# Patient Record
Sex: FEMALE | Race: WHITE
[De-identification: names, ages, dates, MRNs, and addresses within clinical notes are randomized per-mention and may not be internally consistent; named-entity substitution may affect disease eponyms.]

---

## 2017-06-10 ENCOUNTER — HOSPITAL ENCOUNTER (EMERGENCY)
Dept: HOSPITAL 10 - E/R | Age: 57
Discharge: HOME | End: 2017-06-10
Payer: MEDICAID

## 2017-06-10 VITALS
HEIGHT: 62 IN | HEIGHT: 62 IN | BODY MASS INDEX: 32.05 KG/M2 | WEIGHT: 174.17 LBS | BODY MASS INDEX: 32.05 KG/M2 | WEIGHT: 174.17 LBS

## 2017-06-10 VITALS — TEMPERATURE: 98.3 F | TEMPERATURE: 98.3 F

## 2017-06-10 VITALS — SYSTOLIC BLOOD PRESSURE: 106 MMHG | RESPIRATION RATE: 16 BRPM | DIASTOLIC BLOOD PRESSURE: 65 MMHG | HEART RATE: 79 BPM

## 2017-06-10 DIAGNOSIS — K29.00: ICD-10-CM

## 2017-06-10 DIAGNOSIS — K76.0: Primary | ICD-10-CM

## 2017-06-10 DIAGNOSIS — K21.9: ICD-10-CM

## 2017-06-10 LAB
ADD SCAN DIFF: NO
ADD UMIC: YES
ALBUMIN SERPL-MCNC: 4.6 G/DL (ref 3.3–4.9)
ALBUMIN/GLOB SERPL: 1.53 {RATIO}
ALP SERPL-CCNC: 109 IU/L (ref 42–121)
ALT SERPL-CCNC: 111 IU/L (ref 13–69)
ANION GAP SERPL CALC-SCNC: 15 MMOL/L (ref 8–16)
AST SERPL-CCNC: 63 IU/L (ref 15–46)
BACTERIA #/AREA URNS HPF: (no result) /[HPF]
BASOPHILS # BLD AUTO: 0.1 10^3/UL (ref 0–0.1)
BASOPHILS NFR BLD: 0.7 % (ref 0–2)
BILIRUB DIRECT SERPL-MCNC: 0 MG/DL (ref 0–0.2)
BILIRUB SERPL-MCNC: 0.3 MG/DL (ref 0.2–1.3)
BUN SERPL-MCNC: 14 MG/DL (ref 7–20)
CALCIUM SERPL-MCNC: 9.6 MG/DL (ref 8.4–10.2)
CHLORIDE SERPL-SCNC: 106 MMOL/L (ref 97–110)
CO2 SERPL-SCNC: 26 MMOL/L (ref 21–31)
COLOR UR: (no result)
CREAT SERPL-MCNC: 0.6 MG/DL (ref 0.44–1)
EOSINOPHIL # BLD: 0.5 10^3/UL (ref 0–0.5)
EOSINOPHIL NFR BLD: 3.8 % (ref 0–7)
ERYTHROCYTE [DISTWIDTH] IN BLOOD BY AUTOMATED COUNT: 13.2 % (ref 11.5–14.5)
GLOBULIN SER-MCNC: 3 G/DL (ref 1.3–3.2)
GLUCOSE SERPL-MCNC: 128 MG/DL (ref 70–220)
GLUCOSE UR STRIP-MCNC: NEGATIVE %
HCT VFR BLD CALC: 39.8 % (ref 37–47)
HGB BLD-MCNC: 13.4 G/DL (ref 12–16)
INR PPP: 0.95
KETONES UR STRIP.AUTO-MCNC: NEGATIVE MG/DL
LYMPHOCYTES # BLD AUTO: 3.4 10^3/UL (ref 0.8–2.9)
LYMPHOCYTES NFR BLD AUTO: 28.2 % (ref 15–51)
MCH RBC QN AUTO: 31 PG (ref 29–33)
MCHC RBC AUTO-ENTMCNC: 33.7 G/DL (ref 32–37)
MCV RBC AUTO: 92.1 FL (ref 82–101)
MONOCYTES # BLD: 0.7 10^3/UL (ref 0.3–0.9)
MONOCYTES NFR BLD: 5.7 % (ref 0–11)
NEUTROPHILS # BLD: 7.4 10^3/UL (ref 1.6–7.5)
NEUTROPHILS NFR BLD AUTO: 61.3 % (ref 39–77)
NITRITE UR QL STRIP.AUTO: NEGATIVE
NRBC # BLD MANUAL: 0 10^3/UL (ref 0–0)
NRBC BLD QL: 0 /100WBC (ref 0–0)
PLATELET # BLD: 315 10^3/UL (ref 140–415)
PMV BLD AUTO: 11.4 FL (ref 7.4–10.4)
POTASSIUM SERPL-SCNC: 4 MMOL/L (ref 3.5–5.1)
PROT SERPL-MCNC: 7.6 G/DL (ref 6.1–8.1)
PROTHROMBIN TIME: 12.7 SEC (ref 12.2–14.2)
PT RATIO: 1
RBC # BLD AUTO: 4.32 10^6/UL (ref 4.2–5.4)
RBC # UR AUTO: (no result) /UL
RBC #/AREA URNS HPF: (no result) /HPF
SODIUM SERPL-SCNC: 143 MMOL/L (ref 135–144)
SQUAMOUS #/AREA URNS HPF: (no result) /[HPF]
TROPONIN I SERPL-MCNC: < 0.012 NG/ML (ref 0–0.12)
URINE BILIRUBIN (DIP): NEGATIVE
URINE TOTAL PROTEIN (DIP): NEGATIVE
UROBILINOGEN UR STRIP-ACNC: (no result) (ref 0.1–1)
WBC # BLD AUTO: 12.1 10^3/UL (ref 4.8–10.8)
WBC # UR STRIP: (no result) /UL

## 2017-06-10 PROCEDURE — 85610 PROTHROMBIN TIME: CPT

## 2017-06-10 PROCEDURE — 84703 CHORIONIC GONADOTROPIN ASSAY: CPT

## 2017-06-10 PROCEDURE — 76705 ECHO EXAM OF ABDOMEN: CPT

## 2017-06-10 PROCEDURE — 83690 ASSAY OF LIPASE: CPT

## 2017-06-10 PROCEDURE — 80053 COMPREHEN METABOLIC PANEL: CPT

## 2017-06-10 PROCEDURE — 93005 ELECTROCARDIOGRAM TRACING: CPT

## 2017-06-10 PROCEDURE — 81001 URINALYSIS AUTO W/SCOPE: CPT

## 2017-06-10 PROCEDURE — 85025 COMPLETE CBC W/AUTO DIFF WBC: CPT

## 2017-06-10 PROCEDURE — 36415 COLL VENOUS BLD VENIPUNCTURE: CPT

## 2017-06-10 PROCEDURE — 84484 ASSAY OF TROPONIN QUANT: CPT

## 2017-06-10 PROCEDURE — 71010: CPT

## 2017-06-10 NOTE — RADRPT
PROCEDURE:   XR Chest.

 

CLINICAL INDICATION:   Abdominal Pain.  Chest pain.

 

TECHNIQUE:   Single frontal chest x-ray. 

 

COMPARISON:   03/05/2016

 

FINDINGS:

The lungs are clear of acute infiltrates, edema, effusions, or masses..  The cardiomediastinal silho
uette is unremarkable. The osseous structures are intact.   

 

IMPRESSION:

No acute cardiopulmonary disease.   

 

RPTAT: QQ

_____________________________________________ 

.Ludin Jose MD, MD           Date    Time 

Electronically viewed and signed by .Ludin Jose MD, MD on 06/10/2017 18:12 

 

D:  06/10/2017 18:12  T:  06/10/2017 18:12

.L/

## 2017-06-10 NOTE — RADRPT
PROCEDURE:   US Abdomen (right upper quadrant). 

 

CLINICAL INDICATION:   Right upper quadrant pain 

 

TECHNIQUE:   Multiple real-time longitudinal and transverse images of the right upper quadrant of th
e abdomen were acquired utilizing a curved array transducer. Images were reviewed on a high-resoluti
on PACS workstation. 

 

COMPARISON:   None 

 

FINDINGS:

 

The liver is normal in size and fatty in echogenicity without focal mass or intrahepatic biliary dil
atation.  The gallbladder is normal.  The common bile duct measures 3.5 mm in maximal dimension.  Th
e visualized portions of the pancreas are unremarkable with obscuration of the tail of the pancreas.
  No free fluid is identified.

 

The right kidney measures 11.3 cm in length. There is no evidence of obstructive uropathy or urolith
iasis.  No renal masses seen. Visualized portions aorta and inferior vena cava are normal. 

 

IMPRESSION:

Hepatic steatosis.

Otherwise unremarkable right upper quadrant ultrasound. 

 

RPTAT: QQ

_____________________________________________ 

.Ludin Jose MD, MD           Date    Time 

Electronically viewed and signed by .Ludin Jose MD, MD on 06/10/2017 18:19 

 

D:  06/10/2017 18:19  T:  06/10/2017 18:19

.L/

## 2017-06-10 NOTE — ERD
ER Documentation


Chief Complaint


Date/Time


DATE: 6/10/17 


TIME: 17:42


Chief Complaint


dizziness , feels palpitations ,chest pain , nausea since last night





HPI


Patient is a 57-year-old female who presents with sudden onset, constant, dull, 

waxing and waning epigastric pain since 9 PM last night.  She states that the 

pain has never completely resolved since it started.  She states that the pain 

started while she was eating.  She vomited once.  She reports having one loose 

stool last night and a normal stool today.  She denies dark stools.  She denies 

fevers, shortness of breath.  She reports an acid taste in her mouth.  She 

reports being diagnosed with gallstones 4 months ago.





ROS


All systems reviewed and are negative except as per history of present illness.





Medications


Home Meds


Active Scripts


Famotidine* (Famotidine*) 20 Mg Tablet, 20 MG PO DAILY, #30 TAB


   Prov:REBEKAH MEDINA MD         6/10/17


Reported Medications


Metformin Hcl* (Metformin Hcl*) 500 Mg Tablet, 500 MG PO BID, #30 TAB


   6/10/17


Benazepril Hcl* (Benazepril Hcl*) 5 Mg Tablet, 5 MG PO DAILY, #30 TAB


   6/10/17


Glipizide* (Glipizide*) 5 Mg Tablet, 5 MG PO BID, TAB


   6/10/17


Aspirin* (Aspirin* Chew) 81 Mg Tab.chew, 81 MG PO DAILY, TAB.CHEW


   6/10/17


Discontinued Reported Medications


Aspirin (Jeovany Child) 81 Mg Chew, 81 MG PO


   1/20/12


Discontinued Scripts


Dextromethorphan Hb-Promethazine Hcl (Promethazine DM Syrup) 473 Ml Syrup, 5 ML 

PO Q6H Y for COUGH, #4 OZ


   Prov:CLARIBEL BURRELL MD         9/4/16


Ibuprofen* (Motrin*) 600 Mg Tab, 600 MG PO Q6, #15 TAB


   Prov:CLARIBEL BURRELL MD         9/4/16


Azithromycin* (Zithromax*) 250 Mg Tablet, 250 MG PO .ZPACK AS DIRECTED, #6 TAB


   TAKE 500 MG (2 TABS) THE FIRST DAY THEN 250 MG (1 TAB) DAYS 2-5


   Prov:CLARIBEL BURRELL MD         9/4/16


Fluconazole* (Diflucan*) 150 Mg Tablet, 150 MG PO ONCE, #1 TAB


   Prov:ERNESTO WORTHINGTON NP         7/26/16


Clotrimazole* (Clotrimazole-7*) Vaginal Cream..g., 1 APPLIC VAG HS for 7 Days, 

EA


   Prov:ERNESTO WORTHINGTON NP         7/26/16


Hydrocodone Bit-Acetaminophen* (Norco*) 5-325 Mg Tab, 1 TAB PO Q6 Y for PAIN, #

20 TAB


   Prov:ERNESTO WORTHINGTON NP         7/26/16


Phenazopyridine Hcl* (Pyridium*) 200 Mg Tab, 200 MG PO TID Y for URINARY PAIN, #

6 TAB


   Prov:ERNESTO WORTHINGTON NP         7/26/16


Ciprofloxacin Hcl* (Ciprofloxacin Hcl*) 500 Mg Tablet, 500 MG PO BID for 7 Days

, TAB


   Prov:ERNESTO WORTHINGTON NP         7/26/16


Acetaminophen-Codeine* (Acetaminophen-Cod #3*) 300-30 Mg Tab, 1 TAB PO Q4H Y 

for PAIN, #12 TAB


   Prov:CLARIBEL BURRELL MD         6/4/16


Cephalexin* (Keflex*) 500 Mg Capsule, 500 MG PO QID for 7 Days, CAP


   Prov:CLARIBEL BURRELL MD         6/4/16


Ondansetron Hcl* (Zofran* ODT) 4 mg -ODT Tab.disper, 4 MG PO Q6 Y for NAUSEA AND

/OR VOMITING, #10 TAB


   Prov:ABUNDIO PRITCHARD DO         3/18/16


Hydrocodone Bit-Acetaminophen* (Norco*) 7.5-325 Tablet, 1 TAB PO Q4H Y for PAIN

, #14 TAB


   Prov:ABUNDIO PRITCHARD DO         3/18/16


Ondansetron Hcl* (Zofran*) 4 Mg Tablet, 4 MG PO TID for NAUSEA AND/OR VOMITING, 

#12 TAB


   Prov:LUIS DANIEL HARRIS MD         3/5/16


Cephalexin* (Cephalexin*) 500 Mg Capsule, 500 MG PO TID, #21 CAP


   Prov:LUIS DANIEL HARRIS MD         3/5/16


Glipizide* (Glipizide*) 5 Mg Tablet, 5 MG PO BID, #30 TAB


   Prov:MAUREEN HUMPHREY PA-C         1/23/16


Metformin* (Glucophage*) 1,000 Mg Tablet, 1000 MG PO BID, #30 TAB


   Prov:MAUREEN HUMPHREY PA-C         1/23/16


Hydrocodone Bit-Acetaminophen* (Norco*) 5-325 Mg Tab, 1 TAB PO Q6 Y for PAIN, #

7 TAB


   Prov:TUNDE CRAWFORD DO         8/14/15


Naproxen* (Naprosyn*) 500 Mg Tablet, 500 MG PO BID Y for PAIN AND/OR 

INFLAMMATION, #20 TAB


   Prov:TUNDE CRAWFORD DO         8/14/15


Ibuprofen* (Ibuprofen*) 400 Mg Tablet, 400 MG PO Q6H Y for PAIN, #30 TAB


   Prov:LASHA FERREIRA MD         7/14/15


Metformin* (Glucophage*) 1,000 Mg Tablet, 1000 MG PO BID, #60 TAB 4 Refills


   Prov:LASHA FERREIRA MD         7/14/15


Glipizide* (Glipizide*) 5 Mg Tablet, 5 MG PO BID, #60 TAB 2 Refills


   Prov:LASHA FERREIRA MD         7/14/15


Benazepril Hcl* (Benazepril Hcl*) 5 Mg Tab, 5 MG PO DAILY, #30 TAB 2 Refills


   Prov:LASHA FERREIRA MD         7/14/15





Allergies


Allergies:  


Coded Allergies:  


     No Known Drug Allergies (Verified  Allergy, Unknown, 7/25/16)





PMhx/Soc


Past medical history: Diabetes, hypertension


Past surgical history: Hysterectomy


Social history: Denies tobacco or alcohol


History of Surgery:  Yes (hysterectomy)


Anesthesia Reaction:  No


Hx Neurological Disorder:  No


Hx Respiratory Disorders:  No


Hx Cardiac Disorders:  Yes (HTN)


Hx Psychiatric Problems:  No


Hx Miscellaneous Medical Probl:  Yes (dm, htn)


Hx Alcohol Use:  No


Hx Substance Use:  No


Hx Tobacco Use:  No


Smoking Status:  Never smoker





FmHx


Family History:  No coronary disease, No diabetes





Physical Exam


Vitals





Vital Signs








  Date Time  Temp Pulse Resp B/P Pulse Ox O2 Delivery O2 Flow Rate FiO2


 


6/10/17 20:14  79 16 106/65 100 Room Air  


 


6/10/17 18:38 98.3 79 18 117/53 99 Room Air  


 


6/10/17 15:58 97.9 90 18 135/63 98   








Physical Exam


Const:     Alert, no acute distress


Head:   Atraumatic 


Eyes:    Normal Conjunctiva, no pallor, no icterus


ENT:    Normal External Ears, Nose and Mouth.  Moist mucous membranes


Neck:               Full range of motion..~ No meningismus.  No JVD


Resp:    Clear to auscultation bilaterally, no wheezes, no rales


Cardio:    Regular rate and rhythm, no murmurs


Abd:    Soft, non distended.  Mild tenderness in the epigastrium and right 

upper quadrant, no guarding, no rebound


Skin:    No petechiae or rashes


Back:    No midline or flank tenderness, no CVA tenderness


Ext:    No cyanosis, or edema


Neur:    Awake and alert


Psych:    Normal Mood and Affect


Result Diagram:  


6/10/17 1821                                                                   

             6/10/17 1821





Results 24 hrs





 Laboratory Tests








Test


  6/10/17


18:21 6/10/17


19:40


 


White Blood Count 12.110^3/ul  


 


Red Blood Count 4.3210^6/ul  


 


Hemoglobin 13.4g/dl  


 


Hematocrit 39.8%  


 


Mean Corpuscular Volume 92.1fl  


 


Mean Corpuscular Hemoglobin 31.0pg  


 


Mean Corpuscular Hemoglobin


Concent 33.7g/dl 


  


 


 


Red Cell Distribution Width 13.2%  


 


Platelet Count 25682^3/UL  


 


Mean Platelet Volume 11.4fl  


 


Neutrophils % 61.3%  


 


Lymphocytes % 28.2%  


 


Monocytes % 5.7%  


 


Eosinophils % 3.8%  


 


Basophils % 0.7%  


 


Nucleated Red Blood Cells % 0.0/100WBC  


 


Neutrophils # 7.410^3/ul  


 


Lymphocytes # 3.410^3/ul  


 


Monocytes # 0.710^3/ul  


 


Eosinophils # 0.510^3/ul  


 


Basophils # 0.110^3/ul  


 


Nucleated Red Blood Cells # 0.010^3/ul  


 


Prothrombin Time 12.7Sec  


 


Prothrombin Time Ratio 1.0  


 


INR International Normalized


Ratio 0.95 


  


 


 


Sodium Level 143mmol/L  


 


Potassium Level 4.0mmol/L  


 


Chloride Level 106mmol/L  


 


Carbon Dioxide Level 26mmol/L  


 


Anion Gap 15  


 


Blood Urea Nitrogen 14mg/dl  


 


Creatinine 0.60mg/dl  


 


Glucose Level 128mg/dl  


 


Calcium Level 9.6mg/dl  


 


Total Bilirubin 0.3mg/dl  


 


Direct Bilirubin 0.00mg/dl  


 


Indirect Bilirubin 0.3mg/dl  


 


Aspartate Amino Transf


(AST/SGOT) 63IU/L 


  


 


 


Alanine Aminotransferase


(ALT/SGPT) 111IU/L 


  


 


 


Alkaline Phosphatase 109IU/L  


 


Troponin I < 0.012ng/ml  


 


Total Protein 7.6g/dl  


 


Albumin 4.6g/dl  


 


Globulin 3.00g/dl  


 


Albumin/Globulin Ratio 1.53  


 


Lipase 98U/L  


 


Serum HCG, Qualitative NEGATIVE  


 


Urine Color  LT. YELLOW 


 


Urine Clarity  CLEAR 


 


Urine pH  6.5 


 


Urine Specific Gravity  1.015 


 


Urine Ketones  NEGATIVE 


 


Urine Nitrite  NEGATIVE 


 


Urine Bilirubin  NEGATIVE 


 


Urine Urobilinogen  0.2  E.U./dL 


 


Urine Leukocyte Esterase  TRACE 


 


Urine Microscopic RBC  2-5/HPF 


 


Urine Microscopic WBC  2-5/HPF 


 


Urine Squamous Epithelial


Cells 


  FEW 


 


 


Urine Bacteria  RARE 


 


Urine Hemoglobin  1+ 


 


Urine Glucose  NEGATIVE% 


 


Urine Total Protein  NEGATIVE 








 Current Medications








 Medications


  (Trade)  Dose


 Ordered  Sig/Gala


 Route


 PRN Reason  Start Time


 Stop Time Status Last Admin


Dose Admin


 


 Famotidine


  (Pepcid)  20 mg  ONCE  STAT


 PO


   6/10/17 17:41


 6/10/17 17:42 DC 6/10/17 17:41


 











Procedures/MDM


EKG read by me: Time 1603, rate 84


Rhythm: Normal sinus


Axis: Left axis deviation


Intervals: Normal


ST-T waves: no ischemic changes


Ectopy: No


Q-waves: No


Impression:     No evidence of ischemia or arrhythmia





MDM: Patient is a 57-year-old female who presents with epigastric pain since 

last night.  She reports symptoms that are highly suggestive of GERD.  Her pain 

is been constant since onset.  She has no evidence of ischemia on EKG and 

negative troponin.  Her LFTs are unremarkable except for mild elevation of ALT 

greater than AST.  Review of the patient's chart shows hepatic steatosis on a 

CT scan during a visit 2 years ago.  The patient reports a history of gallstones

, but on ultrasound has no evidence of gallstones or other biliary disease.  

The patient does have evidence of ongoing hepatic steatosis.  The patient was 

given Pepcid in the ER, and states that her symptoms have completely resolved.  

Her symptoms are consistent with a GI cause.  I have advised her to return to 

the ER for worsening symptoms and otherwise to follow-up with her PMD.  I will 

prescribe her Pepcid.  I have advised her to eat a low-fat diet.





Departure


Diagnosis:  


 Primary Impression:  


 Hepatic steatosis


 Additional Impressions:  


 Gastritis


 Gastritis type:  unspecified gastritis  Chronicity:  acute  Gastritis bleeding

:  without bleeding  Qualified Code:  K29.00 - Acute gastritis without 

hemorrhage, unspecified gastritis type


 GERD (gastroesophageal reflux disease)


 Esophagitis presence:  esophagitis presence not specified  Qualified Code:  

K21.9 - Gastroesophageal reflux disease, esophagitis presence not specified


Condition:  REBEKAH Gallego MD Jared 10, 2017 17:45

## 2017-06-14 ENCOUNTER — HOSPITAL ENCOUNTER (EMERGENCY)
Dept: HOSPITAL 10 - E/R | Age: 57
Discharge: HOME | End: 2017-06-14
Payer: MEDICAID

## 2017-06-14 VITALS
RESPIRATION RATE: 18 BRPM | DIASTOLIC BLOOD PRESSURE: 81 MMHG | SYSTOLIC BLOOD PRESSURE: 118 MMHG | RESPIRATION RATE: 18 BRPM | DIASTOLIC BLOOD PRESSURE: 81 MMHG | HEART RATE: 81 BPM | HEART RATE: 81 BPM | SYSTOLIC BLOOD PRESSURE: 118 MMHG

## 2017-06-14 VITALS
WEIGHT: 175.27 LBS | BODY MASS INDEX: 31.05 KG/M2 | WEIGHT: 175.27 LBS | BODY MASS INDEX: 31.05 KG/M2 | HEIGHT: 63 IN | HEIGHT: 63 IN

## 2017-06-14 DIAGNOSIS — I10: ICD-10-CM

## 2017-06-14 DIAGNOSIS — E11.9: Primary | ICD-10-CM

## 2017-06-14 DIAGNOSIS — R05: ICD-10-CM

## 2017-06-14 DIAGNOSIS — Z79.84: ICD-10-CM

## 2017-06-14 DIAGNOSIS — R51: ICD-10-CM

## 2017-06-14 DIAGNOSIS — Z79.82: ICD-10-CM

## 2017-06-14 DIAGNOSIS — G47.00: ICD-10-CM

## 2017-06-14 DIAGNOSIS — J02.9: ICD-10-CM

## 2017-06-14 LAB
ADD SCAN DIFF: NO
ADD UMIC: YES
ALBUMIN SERPL-MCNC: 4.9 G/DL (ref 3.3–4.9)
ALBUMIN/GLOB SERPL: 1.68 {RATIO}
ALP SERPL-CCNC: 112 IU/L (ref 42–121)
ALT SERPL-CCNC: 111 IU/L (ref 13–69)
ANION GAP SERPL CALC-SCNC: 14 MMOL/L (ref 8–16)
AST SERPL-CCNC: 69 IU/L (ref 15–46)
BACTERIA #/AREA URNS HPF: (no result) /[HPF]
BASOPHILS # BLD AUTO: 0.1 10^3/UL (ref 0–0.1)
BASOPHILS NFR BLD: 0.5 % (ref 0–2)
BILIRUB DIRECT SERPL-MCNC: 0 MG/DL (ref 0–0.2)
BILIRUB SERPL-MCNC: 0.5 MG/DL (ref 0.2–1.3)
BUN SERPL-MCNC: 14 MG/DL (ref 7–20)
CALCIUM SERPL-MCNC: 9.5 MG/DL (ref 8.4–10.2)
CHLORIDE SERPL-SCNC: 101 MMOL/L (ref 97–110)
CO2 SERPL-SCNC: 27 MMOL/L (ref 21–31)
COLOR UR: (no result)
CREAT SERPL-MCNC: 0.65 MG/DL (ref 0.44–1)
EOSINOPHIL # BLD: 0.6 10^3/UL (ref 0–0.5)
EOSINOPHIL NFR BLD: 5.8 % (ref 0–7)
ERYTHROCYTE [DISTWIDTH] IN BLOOD BY AUTOMATED COUNT: 13.2 % (ref 11.5–14.5)
GLOBULIN SER-MCNC: 2.9 G/DL (ref 1.3–3.2)
GLUCOSE SERPL-MCNC: 136 MG/DL (ref 70–220)
GLUCOSE UR STRIP-MCNC: NEGATIVE %
HCT VFR BLD CALC: 41.3 % (ref 37–47)
HGB BLD-MCNC: 13.5 G/DL (ref 12–16)
KETONES UR STRIP.AUTO-MCNC: NEGATIVE MG/DL
LYMPHOCYTES # BLD AUTO: 2.6 10^3/UL (ref 0.8–2.9)
LYMPHOCYTES NFR BLD AUTO: 23.6 % (ref 15–51)
MCH RBC QN AUTO: 29.8 PG (ref 29–33)
MCHC RBC AUTO-ENTMCNC: 32.7 G/DL (ref 32–37)
MCV RBC AUTO: 91.2 FL (ref 82–101)
MONOCYTES # BLD: 1 10^3/UL (ref 0.3–0.9)
MONOCYTES NFR BLD: 9.1 % (ref 0–11)
NEUTROPHILS # BLD: 6.7 10^3/UL (ref 1.6–7.5)
NEUTROPHILS NFR BLD AUTO: 60.5 % (ref 39–77)
NITRITE UR QL STRIP.AUTO: NEGATIVE
NRBC # BLD MANUAL: 0 10^3/UL (ref 0–0)
NRBC BLD QL: 0 /100WBC (ref 0–0)
PLATELET # BLD: 300 10^3/UL (ref 140–415)
PMV BLD AUTO: 11.1 FL (ref 7.4–10.4)
POTASSIUM SERPL-SCNC: 4.1 MMOL/L (ref 3.5–5.1)
PROT SERPL-MCNC: 7.8 G/DL (ref 6.1–8.1)
RBC # BLD AUTO: 4.53 10^6/UL (ref 4.2–5.4)
RBC # UR AUTO: (no result) /UL
RBC #/AREA URNS HPF: (no result) /HPF
SODIUM SERPL-SCNC: 138 MMOL/L (ref 135–144)
SQUAMOUS #/AREA URNS HPF: (no result) /[HPF]
TROPONIN I SERPL-MCNC: < 0.012 NG/ML (ref 0–0.12)
URINE BILIRUBIN (DIP): NEGATIVE
URINE TOTAL PROTEIN (DIP): NEGATIVE
UROBILINOGEN UR STRIP-ACNC: (no result) (ref 0.1–1)
WBC # BLD AUTO: 11 10^3/UL (ref 4.8–10.8)
WBC # UR STRIP: NEGATIVE /UL

## 2017-06-14 PROCEDURE — 71010: CPT

## 2017-06-14 PROCEDURE — 81001 URINALYSIS AUTO W/SCOPE: CPT

## 2017-06-14 PROCEDURE — 85025 COMPLETE CBC W/AUTO DIFF WBC: CPT

## 2017-06-14 PROCEDURE — 84484 ASSAY OF TROPONIN QUANT: CPT

## 2017-06-14 PROCEDURE — 83690 ASSAY OF LIPASE: CPT

## 2017-06-14 PROCEDURE — 96374 THER/PROPH/DIAG INJ IV PUSH: CPT

## 2017-06-14 PROCEDURE — 36415 COLL VENOUS BLD VENIPUNCTURE: CPT

## 2017-06-14 PROCEDURE — 93005 ELECTROCARDIOGRAM TRACING: CPT

## 2017-06-14 PROCEDURE — 80053 COMPREHEN METABOLIC PANEL: CPT

## 2017-06-14 NOTE — RADRPT
PROCEDURE:   CHEST - 1 VIEW  

 

CLINICAL INDICATION:   57-year-old female with chest/abdominal pain. 

 

TECHNIQUE:   A single frontal AP upright view of the chest was performed.  The images were reviewed 
on a PACS workstation. 

 

COMPARISON:   Chest x-ray Khushi 10, 2017.  

 

FINDINGS:

 

The cardiomediastinal silhouette has a normal appearance.  There is no evidence for an infiltrate.  
There is no evidence for congestive heart failure. There is no evidence for pneumothorax. The osseou
s structures are intact. 

 

IMPRESSION:

 

No evidence for active cardiopulmonary disease.

_____________________________________________ 

.Rodolfo Downey MD, MD           Date    Time 

Electronically viewed and signed by .Rodolfo Downey MD, MD on 06/14/2017 22:12 

 

D:  06/14/2017 22:12  T:  06/14/2017 22:12

.M/

## 2017-06-14 NOTE — ERD
ER Documentation


Chief Complaint


Date/Time


DATE: 17 


TIME: 20:13


Chief Complaint


abd pain, cough x 3 days, headache , insomnia





HPI


57-year-old female history of diabetes, hypertension and dyslipidemia presents 

the ED with multiple symptoms including generalized body pain, abdominal pain, 

sore throat, cough, headache and insomnia.  Patient with multiple previous 

visits for similar symptoms most recently 6/10/2017.  Ongoing, mild, burning, 

epigastric abdominal pain without nausea, vomiting, diarrhea, constipation, 

hematemesis, hematochezia or melanotic stools.  Generalized, gradual onset, 

mild headache which he has chronically.  Nonproductive cough but no shortness 

of breath, orthopnea or exertional dyspnea.  Denies leg pain or swelling.  Mild 

odynophagia but no dysphagia.  No rhinorrhea or nasal congestion.  No fevers or 

chills.





ROS


All systems reviewed and are negative except as per history of present illness.





Medications


Home Meds


Reported Medications


Simvastatin* (Zocor*) 5 Mg Tablet, 5 MG PO QHS, #30 TAB


   17


Metformin Hcl* (Metformin Hcl*) 500 Mg Tablet, 500 MG PO BID, #30 TAB


   6/10/17


Benazepril Hcl* (Benazepril Hcl*) 5 Mg Tablet, 5 MG PO DAILY, #30 TAB


   6/10/17


Glipizide* (Glipizide*) 5 Mg Tablet, 5 MG PO BID, TAB


   6/10/17


Aspirin* (Aspirin* Chew) 81 Mg Tab.chew, 81 MG PO DAILY, TAB.CHEW


   6/10/17


Discontinued Reported Medications


Aspirin (Jeovany Child) 81 Mg Chew, 81 MG PO


   12


Discontinued Scripts


Famotidine* (Famotidine*) 20 Mg Tablet, 20 MG PO DAILY, #30 TAB


   Prov:REBEKAH MEDINA MD         6/10/17


Dextromethorphan Hb-Promethazine Hcl (Promethazine DM Syrup) 473 Ml Syrup, 5 ML 

PO Q6H Y for COUGH, #4 OZ


   Prov:CLARIBEL BURRELL MD         16


Ibuprofen* (Motrin*) 600 Mg Tab, 600 MG PO Q6, #15 TAB


   Prov:CLARIBEL BURRELL MD         16


Azithromycin* (Zithromax*) 250 Mg Tablet, 250 MG PO .ZPACK AS DIRECTED, #6 TAB


   TAKE 500 MG (2 TABS) THE FIRST DAY THEN 250 MG (1 TAB) DAYS 2-5


   Prov:CLARIBEL BURRELL MD         16


Fluconazole* (Diflucan*) 150 Mg Tablet, 150 MG PO ONCE, #1 TAB


   Prov:ERNESTO WORTHINGTON NP         16


Clotrimazole* (Clotrimazole-7*) Vaginal Cream..g., 1 APPLIC VAG HS for 7 Days, 

EA


   Prov:ERNESTO WORTHINGTON NP         16


Hydrocodone Bit-Acetaminophen* (Norco*) 5-325 Mg Tab, 1 TAB PO Q6 Y for PAIN, #

20 TAB


   Prov:ERNESTO WORTHINGTON NP         16


Phenazopyridine Hcl* (Pyridium*) 200 Mg Tab, 200 MG PO TID Y for URINARY PAIN, #

6 TAB


   Prov:ERNESTO WORTHINGTON NP         16


Ciprofloxacin Hcl* (Ciprofloxacin Hcl*) 500 Mg Tablet, 500 MG PO BID for 7 Days

, TAB


   Prov:ERNESTO WORTHINGTON NP         16


Acetaminophen-Codeine* (Acetaminophen-Cod #3*) 300-30 Mg Tab, 1 TAB PO Q4H Y 

for PAIN, #12 TAB


   Prov:CLARIBEL BURRELL MD         16


Cephalexin* (Keflex*) 500 Mg Capsule, 500 MG PO QID for 7 Days, CAP


   Prov:CLARIBEL BURRELL MD         16


Ondansetron Hcl* (Zofran* ODT) 4 mg -ODT Tab.disper, 4 MG PO Q6 Y for NAUSEA AND

/OR VOMITING, #10 TAB


   Prov:JEFF PRITCHARDSTSYLVIES AAmi DO         3/18/16


Hydrocodone Bit-Acetaminophen* (Norco*) 7.5-325 Tablet, 1 TAB PO Q4H Y for PAIN

, #14 TAB


   Prov:JEFF PRITCHARDSTOLOS A. DO         3/18/16


Ondansetron Hcl* (Zofran*) 4 Mg Tablet, 4 MG PO TID for NAUSEA AND/OR VOMITING, 

#12 TAB


   Prov:LUIS DANIEL HARRIS MD         3/5/16


Cephalexin* (Cephalexin*) 500 Mg Capsule, 500 MG PO TID, #21 CAP


   Prov:LUIS DANIEL HARRIS MD         3/5/16


Glipizide* (Glipizide*) 5 Mg Tablet, 5 MG PO BID, #30 TAB


   Prov:MAUREEN HUMPHREY PA-C         16


Metformin* (Glucophage*) 1,000 Mg Tablet, 1000 MG PO BID, #30 TAB


   Prov:MAUREEN HUMPHREY PA-C         16


Hydrocodone Bit-Acetaminophen* (Norco*) 5-325 Mg Tab, 1 TAB PO Q6 Y for PAIN, #

7 TAB


   Prov:TUNDE CRAWFORD DO         8/14/15


Naproxen* (Naprosyn*) 500 Mg Tablet, 500 MG PO BID Y for PAIN AND/OR 

INFLAMMATION, #20 TAB


   Prov:TUNDE CRAWFORD DO         8/14/15


Ibuprofen* (Ibuprofen*) 400 Mg Tablet, 400 MG PO Q6H Y for PAIN, #30 TAB


   Prov:LASHA FERREIRA MD         7/14/15


Metformin* (Glucophage*) 1,000 Mg Tablet, 1000 MG PO BID, #60 TAB 4 Refills


   Prov:LASHA FERREIRA MD         7/14/15


Glipizide* (Glipizide*) 5 Mg Tablet, 5 MG PO BID, #60 TAB 2 Refills


   Prov:LASHA FERREIRA MD         7/14/15


Benazepril Hcl* (Benazepril Hcl*) 5 Mg Tab, 5 MG PO DAILY, #30 TAB 2 Refills


   Prov:LASHA FERREIRA MD         7/14/15





Allergies


Allergies:  


Coded Allergies:  


     No Known Drug Allergies (Verified  Allergy, Unknown, 17)





PMhx/Soc


Reviewed in chart.


History of Surgery:  Yes (hysterectomy)


Anesthesia Reaction:  No


Hx Neurological Disorder:  No


Hx Respiratory Disorders:  No


Hx Cardiac Disorders:  Yes (HTN)


Hx Psychiatric Problems:  No


Hx Miscellaneous Medical Probl:  Yes (dm, htn)


Hx Alcohol Use:  No


Hx Substance Use:  No


Hx Tobacco Use:  No





FmHx


Reviewed in chart.  As per HPI.  No stroke or cancer





Physical Exam


Vitals





Vital Signs








  Date Time  Temp Pulse Resp B/P Pulse Ox O2 Delivery O2 Flow Rate FiO2


 


17 18:26 99.3 105 20 125/71 97   








Physical Exam


Const:    Alert, anxious


Head:   Atraumatic 


Eyes:    Normal Conjunctiva


ENT:    Normal External Ears, Nose and Mouth.  Pharynx is clear without 

erythema or exudate.


Neck:               Full range of motion.  Nontender.  No JVD.  No 

lymphadenopathy or tenderness.


Resp:    Clear to auscultation bilaterally


Cardio:    Regular rate and rhythm, no murmurs


Abd:    Soft, obese.  Mild epigastric tenderness but no rebound or guarding.  

No masses or abnormal pulsations.  Bowel sounds are normal.


Skin:    No petechiae or rashes


Back:    No midline or flank tenderness


Ext:    No cyanosis, or edema


Neur:    Awake and alert


Psych:    Patient appears anxious but not depressed.


Result Diagram:  


17





Results 24 hrs








 Laboratory Tests








Test


  17


20:22 17


20:25


 


Urine Color LT. YELLOW  


 


Urine Clarity CLEAR  


 


Urine pH 7.5  


 


Urine Specific Gravity 1.010  


 


Urine Ketones NEGATIVE  


 


Urine Nitrite NEGATIVE  


 


Urine Bilirubin NEGATIVE  


 


Urine Urobilinogen 0.2  E.U./dL  


 


Urine Leukocyte Esterase NEGATIVE  


 


Urine Microscopic RBC 0-2/HPF  


 


Urine Microscopic WBC 0-2/HPF  


 


Urine Squamous Epithelial


Cells FEW 


  


 


 


Urine Bacteria RARE  


 


Urine Hemoglobin 1+  


 


Urine Glucose NEGATIVE%  


 


Urine Total Protein NEGATIVE  


 


White Blood Count  11.010^3/ul 


 


Red Blood Count  4.5310^6/ul 


 


Hemoglobin  13.5g/dl 


 


Hematocrit  41.3% 


 


Mean Corpuscular Volume  91.2fl 


 


Mean Corpuscular Hemoglobin  29.8pg 


 


Mean Corpuscular Hemoglobin


Concent 


  32.7g/dl 


 


 


Red Cell Distribution Width  13.2% 


 


Platelet Count  75162^3/UL 


 


Mean Platelet Volume  11.1fl 


 


Neutrophils %  60.5% 


 


Lymphocytes %  23.6% 


 


Monocytes %  9.1% 


 


Eosinophils %  5.8% 


 


Basophils %  0.5% 


 


Nucleated Red Blood Cells %  0.0/100WBC 


 


Neutrophils #  6.710^3/ul 


 


Lymphocytes #  2.610^3/ul 


 


Monocytes #  1.010^3/ul 


 


Eosinophils #  0.610^3/ul 


 


Basophils #  0.110^3/ul 


 


Nucleated Red Blood Cells #  0.010^3/ul 


 


Sodium Level  138mmol/L 


 


Potassium Level  4.1mmol/L 


 


Chloride Level  101mmol/L 


 


Carbon Dioxide Level  27mmol/L 


 


Anion Gap  14 


 


Blood Urea Nitrogen  14mg/dl 


 


Creatinine  0.65mg/dl 


 


Glucose Level  136mg/dl 


 


Calcium Level  9.5mg/dl 


 


Total Bilirubin  0.5mg/dl 


 


Direct Bilirubin  0.00mg/dl 


 


Indirect Bilirubin  0.5mg/dl 


 


Aspartate Amino Transf


(AST/SGOT) 


  69IU/L 


 


 


Alanine Aminotransferase


(ALT/SGPT) 


  111IU/L 


 


 


Alkaline Phosphatase  112IU/L 


 


Troponin I  < 0.012ng/ml 


 


Total Protein  7.8g/dl 


 


Albumin  4.9g/dl 


 


Globulin  2.90g/dl 


 


Albumin/Globulin Ratio  1.68 


 


Lipase  128U/L 








 Current Medications








 Medications


  (Trade)  Dose


 Ordered  Sig/Gala


 Route


 PRN Reason  Start Time


 Stop Time Status Last Admin


Dose Admin


 


 Ketorolac


 Tromethamine


  (Toradol)  15 mg  ONCE  STAT


 IV


   17 20:13


 17 20:14 DC 17 20:22


 














PROCEDURE:   CHEST - 1 VIEW  


 


CLINICAL INDICATION:   57-year-old female with chest/abdominal pain. 


 


TECHNIQUE:   A single frontal AP upright view of the chest was performed.  The 

images were reviewed on a PACS workstation. 


 


COMPARISON:   Chest x-ray Khushi 10, 2017.  


 


FINDINGS:


 


The cardiomediastinal silhouette has a normal appearance.  There is no evidence 

for an infiltrate.  There is no evidence for congestive heart failure. There is 

no evidence for pneumothorax. The osseous structures are intact. 


 


IMPRESSION:


 


No evidence for active cardiopulmonary disease.


_____________________________________________ 


.Rodolfo Downey MD, MD           Date    Time 


Electronically viewed and signed by .Rodolfo Downey MD, MD on 2017 22:12 


 


D:  2017 22:12  T:  2017 22:12


.M/














EKG:   Time:20:33.  Sinus rhythm.  Ventricular rate 93.  Left axis deviation.  

No acute ST segment elevation or depression.  Normal MT QRS.  No ectopy.  EP 

interpretation: Abnormal ECG





Procedures/MDM


DOCUMENTS REVIEWED:   ED nurse, prior ED, prior records








MEDICAL DECISION MAKIN-year-old female history of diabetes, hypertension 

and dyslipidemia presents the ED with multiple symptoms including generalized 

body pain, abdominal pain, sore throat, cough, headache and insomnia.  ED 

workup is unremarkable.  Abdominal exam is benign without rebound, guarding or 

other signs of peritonitis.  Recent imaging studies were unremarkable.  Lungs 

are clear without evidence of bronchospasm and chest x-ray reveals no evidence 

of infiltrate, effusion or pulmonary vascular congestion.  Nonspecific headache 

of uncertain etiology.  No focal deficit or other signs of CVA or TIA.  This is 

the patient's baseline headache which is gradual onset, not described of the 

worst headache of her life or concerning for subarachnoid hemorrhage, mass or 

hydrocephalus hence neuroimaging is deferred.  No signs of meningitis or 

encephalitis.  Patient complains of odynophagia but exam is unremarkable.  A 

supratentorial component to her symptoms including depression is considered.  

She denies any suicidality or homicidality.  Additional stressors include 

recent loss of her medical insurance coverage.  Stable for discharge 

precautionary instructions and outpatient follow-up as counseled.








Counseled patient and family regarding diagnostic workup, diagnosis and need 

for followup. Understands to return to ED if symptoms recur, worsen or any 

other concerns.





Departure


Diagnosis:  


 Primary Impression:  


 Multiple complaints


 Additional Impressions:  


 Type 2 diabetes mellitus


 Diabetes mellitus complication status:  without complication  Diabetes 

mellitus long term insulin use:  without long term use  Qualified Code:  E11.9 

- Type 2 diabetes mellitus without complication, without long-term current use 

of insulin


 Hypertension


 Hypertension type:  essential hypertension  Qualified Code:  I10 - Essential 

hypertension


 Abdominal pain of unknown cause


Condition:  Stable











CAMILA CERNA MD 2017 20:13

## 2017-08-23 ENCOUNTER — HOSPITAL ENCOUNTER (EMERGENCY)
Dept: HOSPITAL 10 - FTE | Age: 57
Discharge: HOME | End: 2017-08-23
Payer: MEDICAID

## 2017-08-23 VITALS — HEIGHT: 55 IN | WEIGHT: 293 LBS | BODY MASS INDEX: 67.81 KG/M2

## 2017-08-23 DIAGNOSIS — M54.42: Primary | ICD-10-CM

## 2017-08-23 DIAGNOSIS — Z79.84: ICD-10-CM

## 2017-08-23 DIAGNOSIS — E11.9: ICD-10-CM

## 2017-08-23 DIAGNOSIS — Z79.82: ICD-10-CM

## 2017-08-23 DIAGNOSIS — R31.29: ICD-10-CM

## 2017-08-23 DIAGNOSIS — I10: ICD-10-CM

## 2017-08-23 LAB
ADD UMIC: YES
ALBUMIN SERPL-MCNC: 4.5 G/DL (ref 3.3–4.9)
ALBUMIN/GLOB SERPL: 1.32 {RATIO}
ALP SERPL-CCNC: 136 IU/L (ref 42–121)
ALT SERPL-CCNC: 155 IU/L (ref 13–69)
ANION GAP SERPL CALC-SCNC: 15 MMOL/L (ref 8–16)
AST SERPL-CCNC: 87 IU/L (ref 15–46)
BACTERIA #/AREA URNS HPF: (no result) /HPF
BASOPHILS # BLD AUTO: 0.1 10^3/UL (ref 0–0.1)
BASOPHILS NFR BLD: 0.8 % (ref 0–2)
BILIRUB DIRECT SERPL-MCNC: 0 MG/DL (ref 0–0.2)
BILIRUB SERPL-MCNC: 0.4 MG/DL (ref 0.2–1.3)
BUN SERPL-MCNC: 15 MG/DL (ref 7–20)
CALCIUM SERPL-MCNC: 9.3 MG/DL (ref 8.4–10.2)
CHLORIDE SERPL-SCNC: 99 MMOL/L (ref 97–110)
CO2 SERPL-SCNC: 27 MMOL/L (ref 21–31)
COLOR UR: YELLOW
CREAT SERPL-MCNC: 0.97 MG/DL (ref 0.44–1)
EOSINOPHIL # BLD: 0.4 10^3/UL (ref 0–0.5)
EOSINOPHIL NFR BLD: 3.1 % (ref 0–7)
ERYTHROCYTE [DISTWIDTH] IN BLOOD BY AUTOMATED COUNT: 13 % (ref 11.5–14.5)
GLOBULIN SER-MCNC: 3.4 G/DL (ref 1.3–3.2)
GLUCOSE SERPL-MCNC: 312 MG/DL (ref 70–220)
GLUCOSE UR STRIP-MCNC: (no result) MG/DL
HCT VFR BLD CALC: 40.3 % (ref 37–47)
HGB BLD-MCNC: 13.4 G/DL (ref 12–16)
KETONES UR STRIP.AUTO-MCNC: NEGATIVE MG/DL
LYMPHOCYTES # BLD AUTO: 3.3 10^3/UL (ref 0.8–2.9)
LYMPHOCYTES NFR BLD AUTO: 25.4 % (ref 15–51)
MCH RBC QN AUTO: 30.2 PG (ref 29–33)
MCHC RBC AUTO-ENTMCNC: 33.3 G/DL (ref 32–37)
MCV RBC AUTO: 90.8 FL (ref 82–101)
MONOCYTES # BLD: 0.8 10^3/UL (ref 0.3–0.9)
MONOCYTES NFR BLD: 6.2 % (ref 0–11)
NEUTROPHILS NFR BLD AUTO: 64.2 % (ref 39–77)
NITRITE UR QL STRIP.AUTO: NEGATIVE MG/DL
NRBC # BLD MANUAL: 0 10^3/UL (ref 0–0)
NRBC BLD AUTO-RTO: 0 /100WBC (ref 0–0)
PLATELET # BLD: 306 10^3/UL (ref 140–415)
PMV BLD AUTO: 11.2 FL (ref 7.4–10.4)
POTASSIUM SERPL-SCNC: 4 MMOL/L (ref 3.5–5.1)
PROT SERPL-MCNC: 7.9 G/DL (ref 6.1–8.1)
RBC # BLD AUTO: 4.44 10^6/UL (ref 4.2–5.4)
RBC # UR AUTO: (no result) MG/DL
SODIUM SERPL-SCNC: 137 MMOL/L (ref 135–144)
SQUAMOUS #/AREA URNS HPF: (no result) /HPF
UR ASCORBIC ACID: 20 MG/DL
UR BILIRUBIN (DIP): NEGATIVE MG/DL
UR CLARITY: CLEAR
UR PH (DIP): 5 (ref 5–9)
UR RBC: 2 /HPF (ref 0–5)
UR SPECIFIC GRAVITY (DIP): 1.03 (ref 1–1.03)
UR TOTAL PROTEIN (DIP): NEGATIVE MG/DL
URINE BLOOD (DIP) POC: (no result)
UROBILINOGEN UR STRIP-ACNC: NEGATIVE MG/DL
WBC # BLD AUTO: 13.1 10^3/UL (ref 4.8–10.8)
WBC # UR STRIP: NEGATIVE LEU/UL

## 2017-08-23 PROCEDURE — 80053 COMPREHEN METABOLIC PANEL: CPT

## 2017-08-23 PROCEDURE — 74176 CT ABD & PELVIS W/O CONTRAST: CPT

## 2017-08-23 PROCEDURE — 81001 URINALYSIS AUTO W/SCOPE: CPT

## 2017-08-23 PROCEDURE — 96372 THER/PROPH/DIAG INJ SC/IM: CPT

## 2017-08-23 PROCEDURE — 81003 URINALYSIS AUTO W/O SCOPE: CPT

## 2017-08-23 PROCEDURE — 85025 COMPLETE CBC W/AUTO DIFF WBC: CPT

## 2017-08-23 NOTE — ERD
ER Documentation


Chief Complaint


Date/Time


DATE: 8/23/17 


TIME: 18:50


Chief Complaint


back pain





HPI


This 57-year-old female presents to emergency department with complaint of 

sudden onset of left sided back pain radiating to left leg sx started today w/o 

injury.  Patient reports dysuria.  denies alteration in bowel or bladder, 

patient reports history of diabetes.





ROS


All systems reviewed and are negative except as per history of present illness.





Medications


Home Meds


Active Scripts


Hydrocodone/Acetaminophen (Norco 5-325 Tablet) 1 Each Tablet, 1 TAB PO Q6H Y 

for PAIN, #7 TAB


   Prov:SHANTAL,COLLETTE         8/23/17


Reported Medications


Simvastatin* (Zocor*) 5 Mg Tablet, 5 MG PO QHS, #30 TAB


   6/14/17


Metformin Hcl* (Metformin Hcl*) 500 Mg Tablet, 500 MG PO BID, #30 TAB


   6/10/17


Benazepril Hcl* (Benazepril Hcl*) 5 Mg Tablet, 5 MG PO DAILY, #30 TAB


   6/10/17


Glipizide* (Glipizide*) 5 Mg Tablet, 5 MG PO BID, TAB


   6/10/17


Aspirin* (Aspirin* Chew) 81 Mg Tab.chew, 81 MG PO DAILY, TAB.CHEW


   6/10/17





Allergies


Allergies:  


Coded Allergies:  


     No Known Drug Allergies (Verified  Allergy, Unknown, 6/14/17)





PMhx/Soc


History of Surgery:  Yes (hysterectomy)


Anesthesia Reaction:  No


Hx Neurological Disorder:  No


Hx Respiratory Disorders:  No


Hx Cardiac Disorders:  Yes (HTN, HTPERLIPIDS)


Hx Psychiatric Problems:  No


Hx Miscellaneous Medical Probl:  Yes (dm)


Hx Alcohol Use:  No


Hx Substance Use:  No


Hx Tobacco Use:  No


Smoking Status:  Never smoker





Physical Exam


Vitals





Vital Signs








  Date Time  Temp Pulse Resp B/P Pulse Ox O2 Delivery O2 Flow Rate FiO2


 


8/23/17 18:27 98.3 91 20 176/68 97   





Vitals stable, triage notes reviewed, blood pressure noted to be 176/68, 

patient reports she has hypertension on medication.


Physical Exam


Const:     Well-nourished well-appearing well-hydrated no acute distress


Head:   


Eyes:    


ENT:    Normal External Ears, Nose and Mouth mucous membranes moist.


Neck:               


Resp:   Respirations even and unlabored no respiratory distress


Cardio:


Abd:    Soft, non tender, non distended. Normal bowel sounds


Skin:   


Back:   Back Exam:      


 Skin:                     No bruising or rash


 Compartments:       Soft


 Motor:                   Normal flexion and extension of bilateral hip, left 

IT band tightness pain with abduction, straight leg rises negative at 90 

bilateral


 Sensation:           Intact to light touch throughout


 Bones:                  No midline TTP





Ext:


Neur:    Awake and alert


Psych:    Normal Mood and Affect


Result Diagram:  


8/23/17 2025 8/23/17 2025





Results 24 hrs





 Laboratory Tests








Test


  8/23/17


19:10 8/23/17


19:16 8/23/17


20:25


 


Urine Color YELLOW   


 


Urine Clarity CLEAR   


 


Urine pH 5.0   


 


Urine Specific Gravity 1.031   


 


Urine Ketones NEGATIVEmg/dL   


 


Urine Nitrite NEGATIVEmg/dL   


 


Urine Bilirubin NEGATIVEmg/dL   


 


Urine Urobilinogen NEGATIVEmg/dL   


 


Urine Leukocyte Esterase NEGATIVELeu/ul   


 


Urine Microscopic RBC 2/HPF   


 


Urine Microscopic WBC 2/HPF   


 


Urine Squamous Epithelial


Cells FEW/HPF 


  


  


 


 


Urine Bacteria FEW/HPF   


 


Urine Hemoglobin 1+mg/dL   


 


Urine Glucose 3+mg/dL   


 


Urine Total Protein NEGATIVEmg/dl   


 


Bedside Urine pH (LAB)  5.5  


 


Bedside Urine Protein (LAB)  Negative  


 


Bedside Urine Glucose (UA)  0.50%  


 


Bedside Urine Ketones (LAB)  Negative  


 


Bedside Urine Blood  2+  


 


Bedside Urine Nitrite (LAB)  Negative  


 


Bedside Urine Leukocyte


Esterase (L 


  Negative 


  


 


 


White Blood Count   13.110^3/ul 


 


Red Blood Count   4.4410^6/ul 


 


Hemoglobin   13.4g/dl 


 


Hematocrit   40.3% 


 


Mean Corpuscular Volume   90.8fl 


 


Mean Corpuscular Hemoglobin   30.2pg 


 


Mean Corpuscular Hemoglobin


Concent 


  


  33.3g/dl 


 


 


Red Cell Distribution Width   13.0% 


 


Platelet Count   56936^3/UL 


 


Mean Platelet Volume   11.2fl 


 


Neutrophils %   64.2% 


 


Lymphocytes %   25.4% 


 


Monocytes %   6.2% 


 


Eosinophils %   3.1% 


 


Basophils %   0.8% 


 


Nucleated Red Blood Cells %   0.0/100WBC 


 


Neutrophils # (Manual)   810^3/ul 


 


Lymphocytes #   3.310^3/ul 


 


Monocytes #   0.810^3/ul 


 


Eosinophils #   0.410^3/ul 


 


Basophils #   0.110^3/ul 


 


Nucleated Red Blood Cells #   0.010^3/ul 


 


Sodium Level   137mmol/L 


 


Potassium Level   4.0mmol/L 


 


Chloride Level   99mmol/L 


 


Carbon Dioxide Level   27mmol/L 


 


Anion Gap   15 


 


Blood Urea Nitrogen   15mg/dl 


 


Creatinine   0.97mg/dl 


 


Glucose Level   312mg/dl 


 


Calcium Level   9.3mg/dl 


 


Total Bilirubin   0.4mg/dl 


 


Direct Bilirubin   0.00mg/dl 


 


Indirect Bilirubin   0.4mg/dl 


 


Aspartate Amino Transf


(AST/SGOT) 


  


  87IU/L 


 


 


Alanine Aminotransferase


(ALT/SGPT) 


  


  155IU/L 


 


 


Alkaline Phosphatase   136IU/L 


 


Total Protein   7.9g/dl 


 


Albumin   4.5g/dl 


 


Globulin   3.40g/dl 


 


Albumin/Globulin Ratio   1.32 








 Current Medications








 Medications


  (Trade)  Dose


 Ordered  Sig/Gala


 Route


 PRN Reason  Start Time


 Stop Time Status Last Admin


Dose Admin


 


 Diazepam


  (Valium)  5 mg  ONCE  ONCE


 PO


   8/23/17 19:00


 8/23/17 19:01 DC 8/23/17 19:04


 


 


 Ketorolac


 Tromethamine


  (Toradol)  15 mg  ONCE  STAT


 IM


   8/23/17 18:52


 8/23/17 18:54 DC 8/23/17 19:04


 


 


 Acetaminophen/


 Hydrocodone Bitart


  (Norco (5/325))  1 tab  ONCE  ONCE


 PO


   8/23/17 20:30


 8/23/17 20:31 DC 8/23/17 20:28


 





Interpretation text


CBC shows no evidence of hemorrhage or infection WBC is elevated at 13.1


Chemistry shows no evidence of significant electrolyte abnormalities or renal 

insufficiency, nonfasting blood sugar 312


Liver function tests shows no evidence of acute biliary or hepatic dysfunction





Procedures/MDM


POCEDURE:   CT Abdomen and Pelvis without contrast. 


 


CLINICAL INDICATION:    Dysuria and back pain. 


 


TECHNIQUE:   A CT scan of the abdomen and pelvis was performed without 

intravenous contrast.  Coronal and sagittal reformatted images were generated. 

Images were reviewed on a high-resolution PACS workstation. CTDIvol: 18.69 mGy. 

DLP: 1001.17 mGy-cm.  


 


One or more of the following dose reduction techniques were used:  


- Automated exposure control.


- Adjustment of the mA and/or kV according to patient size. 


- Use of iterative reconstruction technique.


 


COMPARISON:   07/25/2016


 


FINDINGS:


 


The lung bases are clear.


 


Evaluation of the abdominal and pelvic viscera is limited by the lack of oral 

and intravenous contrast.


 


The liver is diffusely hypodense, consistent with fatty infiltration.  The 

gallbladder is normal in appearance. The common bile duct is not dilated. The 

spleen is not enlarged. No pancreatic lesion is identified and there is no 

pancreatic ductal dilatation. The adrenal glands are unremarkable. 


 


The kidneys are normal in size. There is no perinephric fat stranding. No 

hydronephrosis is seen. No urinary stone is identified. There is a 1.8 cm left 

renal cyst. 


 


The small and large bowel are normal in caliber. There is no bowel wall 

thickening. There is an appendicolith in the distal appendiceal lumen but no 

appendiceal inflammation.


 


The urinary bladder is unremarkable. The patient is status post hysterectomy.  

No adnexal mass is seen.


 


No lymphadenopathy is identified. There is no ascites. No pneumoperitoneum is 

seen. There are no arterial calcifications.


 


No suspicious osseous lesion is idenitified. 


 


 


IMPRESSION:


 


1.  No inflammation, mass, or lymphadenopathy. 


2.  Normal appendix.


3.  No obstructive uropathy or urinary stone.


4.  Fatty infiltration of the liver. 


5.  Status post hysterectomy.


 


Electronically viewed and signed by .Zackery Holt MD, MD on 08/23/2017 21:36 





This pleasant 57-year-old female presents to emergency department with left-

sided back pain and dysuria.  Patient reports a sudden onset today without 

known injury.  Patient is a diabetic denies any history of renal insufficiency 

or diabetic neuropathy.  Patient shows no signs of cauda equina syndrome, CVA, 

TIA or pyelonephritis.  Emergency course includes pain treatment with 15 mg of 

intramuscular Toradol, Valium 5 mg.  Patient reassessed reports that she still 

feels pain in the middle of her back, Norco 5/325 mg given.  Patient urinalysis 

is positive for +2 microscopic hematuria, without nitrates or leukocytosis.  

Status post hysterectomy, denies vaginal bleeding.  Findings suspicious for 

renal calculi, or renal insufficiency or hydronephrosis.  Patient has further 

workup with laboratory testing, WBCs slightly elevated 13.1, BUN and creatinine 

are normal no evidence of renal dysfunction.  Nonfasting blood sugar elevated.  

Noncontrast CT of abdomen and pelvis documents the kidneys are normal size 

there is no perinephritic fat stranding.  No hydronephrosis is seen.  No 

urinary stone is identified.  There is a 1.8 cm left renal cyst.  Impression is 

no inflammation, mass or lymphadenopathy, normal appendix, no obstructive 

uropathy or urinary stones.  Fatty liver, status post hysterectomy.  Patient 

will be discharged home with [place] 5/325 7 tabs, instructions to follow-up with 

primary care physician for diabetic teaching, weight loss, and low impact 

exercise activity.  Patient is stable with no new complaints during ER course, 

clinically there is no current evidence to suggest meningitis, sepsis, acute 

abdomen, acute coronary syndromes, pulmonary embolism or any other emergent 

condition appearing to require further evaluation or hospitalization.  I feel 

the patient is stable for discharge at this time.  I have discussed results, 

examination findings, the treatment plan with the patient and family present 

prior to discharge.  


Indications for emergent reevaluation, side effects of medication were also 

discussed.  All questions were answered.  Patient verbalizes understanding and 

agrees with plan of care.





Departure


Diagnosis:  


 Primary Impression:  


 Back pain


 Back pain location:  low back pain  Chronicity:  unspecified  Back pain 

laterality:  left  Sciatica presence:  with sciatica  Sciatica laterality:  

sciatica of left side  Qualified Code:  M54.42 - Left-sided low back pain with 

left-sided sciatica, unspecified chronicity


 Additional Impression:  


 Microscopic hematuria











COLLETTE MURGUIA Aug 23, 2017 18:50

## 2017-08-23 NOTE — RADRPT
PROCEDURE:   CT Abdomen and Pelvis without contrast. 

 

CLINICAL INDICATION:    Dysuria and back pain. 

 

TECHNIQUE:   A CT scan of the abdomen and pelvis was performed without intravenous contrast.  Corona
l and sagittal reformatted images were generated. Images were reviewed on a high-resolution PACS wor
kstation. CTDIvol: 18.69 mGy. DLP: 1001.17 mGy-cm.  

 

One or more of the following dose reduction techniques were used:  

- Automated exposure control.

- Adjustment of the mA and/or kV according to patient size. 

- Use of iterative reconstruction technique.

 

COMPARISON:   07/25/2016

 

FINDINGS:

 

The lung bases are clear.

 

Evaluation of the abdominal and pelvic viscera is limited by the lack of oral and intravenous contra
st.

 

The liver is diffusely hypodense, consistent with fatty infiltration.  The gallbladder is normal in 
appearance. The common bile duct is not dilated. The spleen is not enlarged. No pancreatic lesion is
 identified and there is no pancreatic ductal dilatation. The adrenal glands are unremarkable. 

 

The kidneys are normal in size. There is no perinephric fat stranding. No hydronephrosis is seen. No
 urinary stone is identified. There is a 1.8 cm left renal cyst. 

 

The small and large bowel are normal in caliber. There is no bowel wall thickening. There is an appe
ndicolith in the distal appendiceal lumen but no appendiceal inflammation.

 

The urinary bladder is unremarkable. The patient is status post hysterectomy.  No adnexal mass is se
en.

 

No lymphadenopathy is identified. There is no ascites. No pneumoperitoneum is seen. There are no art
erial calcifications.

 

No suspicious osseous lesion is idenitified. 

 

 

IMPRESSION:

 

1.  No inflammation, mass, or lymphadenopathy. 

2.  Normal appendix.

3.  No obstructive uropathy or urinary stone.

4.  Fatty infiltration of the liver. 

5.  Status post hysterectomy.

 

  

 

 

RPTAT: HTAR

_____________________________________________ 

.Zackery Holt MD, MD           Date    Time 

Electronically viewed and signed by .Zackery Holt MD, MD on 08/23/2017 21:36 

 

D:  08/23/2017 21:36  T:  08/23/2017 21:36

.R/

## 2017-11-15 ENCOUNTER — HOSPITAL ENCOUNTER (EMERGENCY)
Dept: HOSPITAL 10 - FTE | Age: 57
Discharge: LEFT BEFORE DISCHARGE | End: 2017-11-15
Payer: SELF-PAY

## 2017-11-15 VITALS
HEIGHT: 64 IN | WEIGHT: 175.49 LBS | HEIGHT: 64 IN | WEIGHT: 175.49 LBS | BODY MASS INDEX: 29.96 KG/M2 | BODY MASS INDEX: 29.96 KG/M2

## 2017-11-15 DIAGNOSIS — Z53.21: Primary | ICD-10-CM

## 2018-07-01 ENCOUNTER — HOSPITAL ENCOUNTER (OUTPATIENT)
Dept: HOSPITAL 91 - TEL | Age: 58
Setting detail: OBSERVATION
LOS: 2 days | Discharge: HOME | End: 2018-07-03
Payer: MEDICAID

## 2018-07-01 ENCOUNTER — HOSPITAL ENCOUNTER (OUTPATIENT)
Age: 58
Setting detail: OBSERVATION
LOS: 2 days | Discharge: HOME | End: 2018-07-03

## 2018-07-01 DIAGNOSIS — R07.9: Primary | ICD-10-CM

## 2018-07-01 DIAGNOSIS — E78.00: ICD-10-CM

## 2018-07-01 DIAGNOSIS — E11.9: ICD-10-CM

## 2018-07-01 DIAGNOSIS — E66.9: ICD-10-CM

## 2018-07-01 LAB
ADD MAN DIFF?: NO
ANION GAP: 14 (ref 8–16)
BASOPHIL #: 0.1 10^3/UL (ref 0–0.1)
BASOPHILS %: 0.6 % (ref 0–2)
BLOOD UREA NITROGEN: 19 MG/DL (ref 7–20)
CALCIUM: 9.4 MG/DL (ref 8.4–10.2)
CARBON DIOXIDE: 25 MMOL/L (ref 21–31)
CHLORIDE: 108 MMOL/L (ref 97–110)
CK INDEX: 0.5
CK-MB: 0.23 NG/ML (ref 0–2.4)
CREATINE KINASE: 51 IU/L (ref 23–200)
CREATININE: 0.54 MG/DL (ref 0.44–1)
EOSINOPHILS #: 0.4 10^3/UL (ref 0–0.5)
EOSINOPHILS %: 3.5 % (ref 0–7)
GLUCOSE: 124 MG/DL (ref 70–220)
HEMATOCRIT: 41.4 % (ref 37–47)
HEMOGLOBIN: 13.6 G/DL (ref 12–16)
LYMPHOCYTES #: 3.2 10^3/UL (ref 0.8–2.9)
LYMPHOCYTES %: 29.2 % (ref 15–51)
MEAN CORPUSCULAR HEMOGLOBIN: 30 PG (ref 29–33)
MEAN CORPUSCULAR HGB CONC: 32.9 G/DL (ref 32–37)
MEAN CORPUSCULAR VOLUME: 91.4 FL (ref 82–101)
MEAN PLATELET VOLUME: 11 FL (ref 7.4–10.4)
MONOCYTE #: 0.8 10^3/UL (ref 0.3–0.9)
MONOCYTES %: 7.1 % (ref 0–11)
NEUTROPHIL #: 6.5 10^3/UL (ref 1.6–7.5)
NEUTROPHILS %: 59.3 % (ref 39–77)
NUCLEATED RED BLOOD CELLS #: 0 10^3/UL (ref 0–0)
NUCLEATED RED BLOOD CELLS%: 0 /100WBC (ref 0–0)
PLATELET COUNT: 322 10^3/UL (ref 140–415)
POTASSIUM: 4.1 MMOL/L (ref 3.5–5.1)
RED BLOOD COUNT: 4.53 10^6/UL (ref 4.2–5.4)
RED CELL DISTRIBUTION WIDTH: 13.1 % (ref 11.5–14.5)
SODIUM: 143 MMOL/L (ref 135–144)
TROPONIN-I: < 0.012 NG/ML (ref 0–0.12)
TROPONIN-I: < 0.012 NG/ML (ref 0–0.12)
WHITE BLOOD COUNT: 11 10^3/UL (ref 4.8–10.8)

## 2018-07-01 PROCEDURE — 83036 HEMOGLOBIN GLYCOSYLATED A1C: CPT

## 2018-07-01 PROCEDURE — 82553 CREATINE MB FRACTION: CPT

## 2018-07-01 PROCEDURE — 78452 HT MUSCLE IMAGE SPECT MULT: CPT

## 2018-07-01 PROCEDURE — 83735 ASSAY OF MAGNESIUM: CPT

## 2018-07-01 PROCEDURE — 82962 GLUCOSE BLOOD TEST: CPT

## 2018-07-01 PROCEDURE — 97161 PT EVAL LOW COMPLEX 20 MIN: CPT

## 2018-07-01 PROCEDURE — 93017 CV STRESS TEST TRACING ONLY: CPT

## 2018-07-01 PROCEDURE — 80061 LIPID PANEL: CPT

## 2018-07-01 PROCEDURE — 93306 TTE W/DOPPLER COMPLETE: CPT

## 2018-07-01 PROCEDURE — 71045 X-RAY EXAM CHEST 1 VIEW: CPT

## 2018-07-01 PROCEDURE — 99285 EMERGENCY DEPT VISIT HI MDM: CPT

## 2018-07-01 PROCEDURE — 85025 COMPLETE CBC W/AUTO DIFF WBC: CPT

## 2018-07-01 PROCEDURE — 82550 ASSAY OF CK (CPK): CPT

## 2018-07-01 PROCEDURE — 80048 BASIC METABOLIC PNL TOTAL CA: CPT

## 2018-07-01 PROCEDURE — 93005 ELECTROCARDIOGRAM TRACING: CPT

## 2018-07-01 PROCEDURE — 93971 EXTREMITY STUDY: CPT

## 2018-07-01 PROCEDURE — 84484 ASSAY OF TROPONIN QUANT: CPT

## 2018-07-01 PROCEDURE — 36415 COLL VENOUS BLD VENIPUNCTURE: CPT

## 2018-07-01 RX ADMIN — ACETAMINOPHEN 1 MG: 325 TABLET, FILM COATED ORAL at 21:54

## 2018-07-01 RX ADMIN — ASPIRIN 81 MG CHEWABLE TABLET 1 MG: 81 TABLET CHEWABLE at 12:53

## 2018-07-01 RX ADMIN — ACETAMINOPHEN 1 MG: 500 TABLET, FILM COATED ORAL at 13:52

## 2018-07-01 RX ADMIN — HYDROCODONE BITARTRATE AND ACETAMINOPHEN 1 TAB: 10; 325 TABLET ORAL at 17:31

## 2018-07-01 RX ADMIN — NITROGLYCERIN 1 INCH: 20 OINTMENT TOPICAL at 12:55

## 2018-07-01 RX ADMIN — ATORVASTATIN CALCIUM 1 MG: 40 TABLET, FILM COATED ORAL at 20:22

## 2018-07-02 LAB
ADD MAN DIFF?: NO
ANION GAP: 15 (ref 8–16)
BASOPHIL #: 0.1 10^3/UL (ref 0–0.1)
BASOPHILS %: 0.6 % (ref 0–2)
BLOOD UREA NITROGEN: 17 MG/DL (ref 7–20)
CALCIUM: 9.2 MG/DL (ref 8.4–10.2)
CARBON DIOXIDE: 26 MMOL/L (ref 21–31)
CHLORIDE: 104 MMOL/L (ref 97–110)
CHOL/HDL RATIO: 4 RATIO
CHOLESTEROL: 192 MG/DL (ref 100–200)
CK INDEX: 0.5
CK-MB: 0.24 NG/ML (ref 0–2.4)
CREATINE KINASE: 47 IU/L (ref 23–200)
CREATININE: 0.52 MG/DL (ref 0.44–1)
EOSINOPHILS #: 0.4 10^3/UL (ref 0–0.5)
EOSINOPHILS %: 3.4 % (ref 0–7)
GLUCOSE: 146 MG/DL (ref 70–220)
HDL CHOLESTEROL: 48 MG/DL (ref 37–92)
HEMATOCRIT: 41.2 % (ref 37–47)
HEMOGLOBIN A1C: 8.2 % (ref 0–5.9)
HEMOGLOBIN: 13.4 G/DL (ref 12–16)
LDL CHOLESTEROL,CALCULATED: 91 MG/DL
LYMPHOCYTES #: 2.9 10^3/UL (ref 0.8–2.9)
LYMPHOCYTES %: 26.8 % (ref 15–51)
MAGNESIUM: 1.8 MG/DL (ref 1.7–2.5)
MEAN CORPUSCULAR HEMOGLOBIN: 29.3 PG (ref 29–33)
MEAN CORPUSCULAR HGB CONC: 32.5 G/DL (ref 32–37)
MEAN CORPUSCULAR VOLUME: 90 FL (ref 82–101)
MEAN PLATELET VOLUME: 11 FL (ref 7.4–10.4)
MONOCYTE #: 0.6 10^3/UL (ref 0.3–0.9)
MONOCYTES %: 5.8 % (ref 0–11)
NEUTROPHIL #: 6.9 10^3/UL (ref 1.6–7.5)
NEUTROPHILS %: 63.1 % (ref 39–77)
NUCLEATED RED BLOOD CELLS #: 0 10^3/UL (ref 0–0)
NUCLEATED RED BLOOD CELLS%: 0 /100WBC (ref 0–0)
PLATELET COUNT: 314 10^3/UL (ref 140–415)
POTASSIUM: 4.2 MMOL/L (ref 3.5–5.1)
RED BLOOD COUNT: 4.58 10^6/UL (ref 4.2–5.4)
RED CELL DISTRIBUTION WIDTH: 12.7 % (ref 11.5–14.5)
SODIUM: 141 MMOL/L (ref 135–144)
TRIGLYCERIDES: 263 MG/DL (ref 0–149)
TROPONIN-I: < 0.012 NG/ML (ref 0–0.12)
WHITE BLOOD COUNT: 10.9 10^3/UL (ref 4.8–10.8)

## 2018-07-02 RX ADMIN — PANTOPRAZOLE SODIUM 1 MG: 20 TABLET, DELAYED RELEASE ORAL at 05:12

## 2018-07-02 RX ADMIN — ATORVASTATIN CALCIUM 1 MG: 40 TABLET, FILM COATED ORAL at 20:21

## 2018-07-02 RX ADMIN — ASPIRIN 81 MG CHEWABLE TABLET 1 MG: 81 TABLET CHEWABLE at 09:11

## 2018-07-02 RX ADMIN — BENAZEPRIL HYDROCHLORIDE 1 MG: 20 TABLET, FILM COATED ORAL at 09:11

## 2018-07-02 RX ADMIN — REGADENOSON 1 MG: 0.08 INJECTION, SOLUTION INTRAVENOUS at 15:36

## 2018-07-02 RX ADMIN — INSULIN ASPART 1 UNIT: 100 INJECTION, SOLUTION INTRAVENOUS; SUBCUTANEOUS at 17:48

## 2018-07-02 RX ADMIN — MORPHINE SULFATE 1 MG: 2 INJECTION, SOLUTION INTRAMUSCULAR; INTRAVENOUS at 00:43

## 2018-07-02 RX ADMIN — INSULIN ASPART 1 UNIT: 100 INJECTION, SOLUTION INTRAVENOUS; SUBCUTANEOUS at 20:22

## 2018-07-02 RX ADMIN — ENOXAPARIN SODIUM 1 MG: 100 INJECTION SUBCUTANEOUS at 09:24

## 2018-07-03 RX ADMIN — ENOXAPARIN SODIUM 1 MG: 100 INJECTION SUBCUTANEOUS at 09:26

## 2018-07-03 RX ADMIN — PANTOPRAZOLE SODIUM 1 MG: 20 TABLET, DELAYED RELEASE ORAL at 05:49

## 2018-07-03 RX ADMIN — BENAZEPRIL HYDROCHLORIDE 1 MG: 20 TABLET, FILM COATED ORAL at 08:11

## 2018-07-03 RX ADMIN — ASPIRIN 81 MG CHEWABLE TABLET 1 MG: 81 TABLET CHEWABLE at 08:11

## 2018-07-03 RX ADMIN — INSULIN ASPART 1 UNIT: 100 INJECTION, SOLUTION INTRAVENOUS; SUBCUTANEOUS at 11:42

## 2018-07-03 RX ADMIN — INSULIN ASPART 1 UNIT: 100 INJECTION, SOLUTION INTRAVENOUS; SUBCUTANEOUS at 07:56

## 2018-12-11 ENCOUNTER — HOSPITAL ENCOUNTER (EMERGENCY)
Dept: HOSPITAL 91 - FTE | Age: 58
Discharge: HOME | End: 2018-12-11
Payer: MEDICAID

## 2018-12-11 ENCOUNTER — HOSPITAL ENCOUNTER (EMERGENCY)
Age: 58
Discharge: HOME | End: 2018-12-11

## 2018-12-11 DIAGNOSIS — E11.9: ICD-10-CM

## 2018-12-11 DIAGNOSIS — M23.92: Primary | ICD-10-CM

## 2018-12-11 DIAGNOSIS — Z79.82: ICD-10-CM

## 2018-12-11 DIAGNOSIS — I10: ICD-10-CM

## 2018-12-11 DIAGNOSIS — Z79.84: ICD-10-CM

## 2018-12-11 PROCEDURE — 96372 THER/PROPH/DIAG INJ SC/IM: CPT

## 2018-12-11 PROCEDURE — 73562 X-RAY EXAM OF KNEE 3: CPT

## 2018-12-11 PROCEDURE — 99284 EMERGENCY DEPT VISIT MOD MDM: CPT

## 2018-12-11 RX ADMIN — KETOROLAC TROMETHAMINE 1 MG: 30 INJECTION, SOLUTION INTRAMUSCULAR at 18:48

## 2019-03-01 ENCOUNTER — HOSPITAL ENCOUNTER (EMERGENCY)
Dept: HOSPITAL 91 - FTE | Age: 59
Discharge: HOME | End: 2019-03-01
Payer: MEDICAID

## 2019-03-01 ENCOUNTER — HOSPITAL ENCOUNTER (EMERGENCY)
Dept: HOSPITAL 10 - FTE | Age: 59
Discharge: HOME | End: 2019-03-01
Payer: MEDICAID

## 2019-03-01 VITALS
BODY MASS INDEX: 29.38 KG/M2 | HEIGHT: 65 IN | WEIGHT: 176.37 LBS | BODY MASS INDEX: 29.38 KG/M2 | WEIGHT: 176.37 LBS | HEIGHT: 65 IN

## 2019-03-01 VITALS — RESPIRATION RATE: 16 BRPM | SYSTOLIC BLOOD PRESSURE: 107 MMHG | HEART RATE: 86 BPM | DIASTOLIC BLOOD PRESSURE: 55 MMHG

## 2019-03-01 DIAGNOSIS — E11.9: ICD-10-CM

## 2019-03-01 DIAGNOSIS — Z79.82: ICD-10-CM
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
## 2019-03-01 DIAGNOSIS — I10: ICD-10-CM

## 2019-03-01 DIAGNOSIS — S89.92XA: Primary | ICD-10-CM
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
## 2019-03-01 DIAGNOSIS — Z79.84: ICD-10-CM

## 2019-03-01 PROCEDURE — 99284 EMERGENCY DEPT VISIT MOD MDM: CPT

## 2019-03-01 PROCEDURE — 96372 THER/PROPH/DIAG INJ SC/IM: CPT

## 2019-03-01 PROCEDURE — 73562 X-RAY EXAM OF KNEE 3: CPT

## 2019-03-01 RX ADMIN — KETOROLAC TROMETHAMINE 1 MG: 30 INJECTION, SOLUTION INTRAMUSCULAR at 21:25

## 2019-03-02 NOTE — ERD
ER Documentation


Chief Complaint


Chief Complaint





pt is bib self with c/o behind the left knee pain starting today, unk cause





HPI


58 year-old [female]  coming in today with Chief Complaint: Knee pain





History of Present Illness: Patient reporting left posterior knee pain starting 


today.  Unknown cause.  Denies any other associated symptoms.  Denies injury.  


Denies trauma.





Review of systems: All systems were reviewed and are negative except for what is


 indicated in the history of present illness.





Past Medical History: Diabetes, hyperlipidemia, hypertension; positive surgical 


history includes hysterectomy





Social History: [Patient denies tobacco, alcohol, elicit drug use]





Medications:  [Reviewed as documented Nursing Notes]





Allergies: [NKDA] 





Social Concerns: Denies





ROS


All systems reviewed and are negative except as per history of present illness.





Medications


Home Meds


Active Scripts


Acetaminophen* (Tylophen*) 500 Mg Capsule, 2 CAP PO Q6 PRN for PAIN AND OR 


ELEVATED TEMP, #30 CAP


   Prov:JESSICA BUCKLEY NP         3/1/19


Naproxen* (Naprosyn*) 500 Mg Tablet, 500 MG PO BID PRN for PAIN AND/OR 


INFLAMMATION, #30 TAB


   Prov:JESSICA BUCKLEY NP         3/1/19


Hydrocodone/Acetaminophen (Norco 5-325 Tablet) 1 Each Tablet, 1 TAB PO BID PRN 


for PAIN, #10 TAB


   Prov:HEMA CHU MD         12/11/18


Prednisone* (Prednisone*) 20 Mg Tab, 40 MG PO DAILY for 5 Days, TAB


   Prov:HEMA CHU MD         12/11/18


Reported Medications


Metformin Hcl* (Metformin Hcl*) 1,000 Mg Tablet, 1000 MG PO WITH BREAKFAST 


DINNE, #60 TAB


   7/1/18


Benazepril Hcl* (Benazepril Hcl*) 20 Mg Tablet, 20 MG PO DAILY, #30 TAB


   7/1/18


Atorvastatin* (Atorvastatin*) 40 Mg Tablet, 40 MG PO QHS, #30 TAB


   7/1/18


Aspirin (Low Dose Aspirin) 81 Mg Tablet.dr, 81 MG PO DAILY, #30 TAB


   7/1/18


Omeprazole* (Omeprazole*) 10 Mg Capsule.dr, 10 MG PO DAILY, #30 CAP


   7/1/18





Allergies


Allergies:  


Coded Allergies:  


     No Known Drug Allergies (Verified  Allergy, Unknown, 7/1/18)





PMhx/Soc


Medical and Surgical Hx:  pt denies Surgical Hx


History of Surgery:  Yes (Hysterectomy)


Anesthesia Reaction:  No


Hx Neurological Disorder:  No


Hx Respiratory Disorders:  No


Hx Cardiac Disorders:  Yes (HTN)


Hx Psychiatric Problems:  No


Hx Miscellaneous Medical Probl:  Yes (DM,Dyslipidemia)


Hx Alcohol Use:  No


Hx Substance Use:  No


Hx Tobacco Use:  No


Smoking Status:  Never smoker





FmHx


Family History:  diabetes, coronary disease





Physical Exam


Vitals





Vital Signs


  Date      Temp  Pulse  Resp  B/P (MAP)   Pulse Ox  O2          O2 Flow    FiO2


Time                                                 Delivery    Rate


    3/1/19  98.3     86    16      107/55        99


     18:08                           (72)





Physical Exam


Const:   No acute distress


Head:   Atraumatic 


Eyes:    Normal Conjunctiva


ENT:    Normal External Ears, Nose and Mouth.


Neck:               Full range of motion. No meningismus.


Resp:   Clear to auscultation bilaterally


Cardio:   Regular rate and rhythm, no murmurs


Abd:    Soft, non tender, non distended. Normal bowel sounds


Skin:   No petechiae or rashes


Back:   No midline or flank tenderness


Ext:    No cyanosis, or edema.  Tenderness noted to posterior left knee, no 


erythema, no warmth, mild swelling, no fluctuance.


Neur:   Awake and alert


Psych:    Normal Mood and Affect


Results 24 hrs





Current Medications


 Medications
   Dose
          Sig/Gala
       Start Time
   Status  Last


 (Trade)       Ordered        Route
 PRN     Stop Time              Admin
Dose


                              Reason                                Admin


 Ketorolac
     30 mg          ONCE  STAT
    3/1/19        DC            3/1/19


Tromethamine
                 IM
            21:16
 3/1/19                21:25



 (Toradol)                                   21:18


 Tramadol       50 mg          ONCE  ONCE
    3/1/19        DC            3/1/19


HCl
                          PO
            21:30
 3/1/19                21:25



(Ultram)                                     21:31








Procedures/MDM


ED course includes a thorough examination and history.  ED course includes 


medication; ketorolac for inflammation and tramadol for pain.  ED course 


includes imaging; left knee xray.





Low suspicion for life-threatening medical emergency or orthopedic emergency.





Otherwise healthy patient presenting with constellation of symptoms likely 


representing uncomplicated left posterior knee pain as characterized by history,


 physical exam findings [radiologic].


----


knee IMPRESSION:


No acute bony abnormality identified in the left knee.


Tricompartmental degenerative changes. Likely intra-articular loose body has 


migrated to the posterior aspect of the knee joint. 


Patellar bone spur.


-----


Patient reassessment at 2330.  Patient with mild relief of pain.  No physical 


signs of pain, no grimacing, vital signs stable.  Ace wrap in place.  Patient 


educated on R ICE.  Patient neurovascularly intact after Ace wrap.  Strict 


follow-up if pain persists.





No respiratory distress, otherwise relatively well appearing and nontoxic. 


Patient educated on diagnoses, prescriptions, follow-up care, return 


precautions.  Strict return precautions given for worsening condition; questions


 answered discharge.





Disposition for discharge with followup in 2-3 days with PCP/clinic.





Departure


Diagnosis:  


   Primary Impression:  


   Knee pain


   Additional Impression:  


   Knee injury


Condition:  Stable


Patient Instructions:  Knee Pain, Uncertain Cause


Referrals:  


[facility]  ([facility]


Usted se ha hecho un examen mdico de control que le indica que no est en yojana 


condicin que requiera tratamiento urgente en el Departamento de Emergencia. Un 


estudio ms profundo y el tratamiento de corral condicin pueden esperar sin ningn 


riesgo hasta que usted sea atendida/o en el consultorio de corral mdico o yojana 


clnica. Es responsabilidad suya arreglar yojana amber para el seguimiento del murray.


 





MANEJO DE CONDICIONES NO URGENTES EN EL FUTURO


1) Si usted tiene un mdico de atencin primaria:





Usted debera llamar a corral mdico de atencin primaria antes de venir al 


departamento de emergencia. Despus de las horas de consultorio, corral doctor o corral 


asociado/a est disponible por telfono. El mdico o enfermero de ha en el 


servicio telefnico puede asesorarle por keya medio para atender el problema, o 


murray contrario se puede programar yojana amber.





2) Si usted no tiene un mdico de atencin primaria:


Llame al mdico o clnica de referencia que aparece abajo sarah las horas de 


consultorio para hacer yojana amber para que le vean.





CLINICAS:


[facility]  [phone] [address] [facility]  [phone] [address] [place] [address]





[facility] [phone] [address] [facility]  [phone]


[address] [facility]   [phone] [address] [facility]. [phone] [address] . [facility] ()


Usted se ha hecho un examen mdico de control que le indica que no est en yojana 


condicin que requiera tratamiento urgente en el Departamento de Emergencia. Un 


estudio ms profundo y el tratamiento de corral condicin pueden esperar sin ningn 


riesgo hasta que usted sea atendida/o en el consultorio de corral mdico o yojana 


clnica. Es responsabilidad suya arreglar yojana amber para el seguimiento del murray.


 





MANEJO DE CONDICIONES NO URGENTES EN EL FUTURO


1) Si usted tiene un mdico de atencin primaria:





Usted debera llamar a corral mdico de atencin primaria antes de venir al 


departamento de emergencia. Despus de las horas de consultorio, corral doctor o corral 


asociado/a est disponible por telfono. El mdico o enfermero de ha en el 


servicio telefnico puede asesorarle por keya medio para atender el problema, o 


murray contrario se puede programar yojana amber.





2) Si usted no tiene un mdico de atencin primaria:


Llame al mdico o condado institucions de referencia que aparece abajo sarah 


las horas de consultorio para hacer yojana amber para que le vean.








SI USTED NO PUEDE PAGAR PARA FELICIANO UN MEDICO puede ir a:


[facility] 


[address] [place] [address]





[facility] 


[address] [place] [address]





[facility] [address] [place] [address]





[place]


[facility]


[address] 


[place], [place] [id number] [phone]





Additional Instructions:  


Call your primary care doctor TOMORROW for an appointment during the next 2-3 


days.See the doctor sooner or return here if your condition worsens before your 


appointment time.





Return to ER if any signs and symptoms of infection, redness, warmth, increased 


swelling, fever, chills.











JESSICA BUCKLEY NP             Mar 2, 2019 01:48

## 2019-06-02 ENCOUNTER — HOSPITAL ENCOUNTER (EMERGENCY)
Dept: HOSPITAL 10 - FTE | Age: 59
Discharge: HOME | End: 2019-06-02
Payer: MEDICAID

## 2019-06-02 ENCOUNTER — HOSPITAL ENCOUNTER (EMERGENCY)
Dept: HOSPITAL 91 - FTE | Age: 59
Discharge: HOME | End: 2019-06-02
Payer: MEDICAID

## 2019-06-02 VITALS — DIASTOLIC BLOOD PRESSURE: 83 MMHG | HEART RATE: 96 BPM | RESPIRATION RATE: 18 BRPM | SYSTOLIC BLOOD PRESSURE: 173 MMHG

## 2019-06-02 VITALS
WEIGHT: 171.96 LBS | HEIGHT: 62 IN | BODY MASS INDEX: 31.64 KG/M2 | BODY MASS INDEX: 31.64 KG/M2 | WEIGHT: 171.96 LBS | HEIGHT: 62 IN

## 2019-06-02 DIAGNOSIS — I10: ICD-10-CM

## 2019-06-02 DIAGNOSIS — Z79.84: ICD-10-CM

## 2019-06-02 DIAGNOSIS — E11.9: ICD-10-CM

## 2019-06-02 DIAGNOSIS — R21: Primary | ICD-10-CM

## 2019-06-02 DIAGNOSIS — Z79.82: ICD-10-CM

## 2019-06-02 DIAGNOSIS — T38.3X5A: ICD-10-CM

## 2019-06-02 PROCEDURE — 99284 EMERGENCY DEPT VISIT MOD MDM: CPT

## 2019-06-02 PROCEDURE — 96372 THER/PROPH/DIAG INJ SC/IM: CPT

## 2019-06-02 RX ADMIN — DIPHENHYDRAMINE HYDROCHLORIDE 1 MG: 50 INJECTION, SOLUTION INTRAMUSCULAR; INTRAVENOUS at 13:50

## 2019-06-02 RX ADMIN — FAMOTIDINE 1 MG: 20 TABLET ORAL at 13:49

## 2019-06-02 NOTE — ERD
ER Documentation


Chief Complaint


Chief Complaint





ITCHY RASH ON TOTAL BODY AFTER TAKING METFORMIN TODAY , NO SOB





HPI


59 female presents with complaint of itchiness and rash starting this morning.  


Denies any wheezing, vomiting, shortness of breath, lightheadedness, new 


medications, new soaps, stridor, fevers.  Denies any treatments.





ROS


All systems reviewed and are negative except as per history of present illness.





Medications


Home Meds


Active Scripts


Prednisone* (Prednisone*) 20 Mg Tab, 60 MG PO DAILY for 4 Days, TAB


   Prov:FLAKITA HARDEN         6/2/19


Diphenhydramine Hcl* (Benadryl*) 50 Mg Cap, 50 MG PO Q6H PRN for ITCHING/RASH, 


#30 CAP


   Prov:FLAKITA HARDEN         6/2/19


Acetaminophen* (Tylophen*) 500 Mg Capsule, 2 CAP PO Q6 PRN for PAIN AND OR 


ELEVATED TEMP, #30 CAP


   Prov:JESSICA BUCKLEY NP         3/1/19


Naproxen* (Naprosyn*) 500 Mg Tablet, 500 MG PO BID PRN for PAIN AND/OR 


INFLAMMATION, #30 TAB


   Prov:JESSICA BUCKLEY NP         3/1/19


Hydrocodone/Acetaminophen (Norco 5-325 Tablet) 1 Each Tablet, 1 TAB PO BID PRN 


for PAIN, #10 TAB


   Prov:HEMA CHU MD         12/11/18


Prednisone* (Prednisone*) 20 Mg Tab, 40 MG PO DAILY for 5 Days, TAB


   Prov:HEMA CHU MD         12/11/18


Reported Medications


Metformin Hcl* (Metformin Hcl*) 1,000 Mg Tablet, 1000 MG PO WITH BREAKFAST 


DINNE, #60 TAB


   7/1/18


Benazepril Hcl* (Benazepril Hcl*) 20 Mg Tablet, 20 MG PO DAILY, #30 TAB


   7/1/18


Atorvastatin* (Atorvastatin*) 40 Mg Tablet, 40 MG PO QHS, #30 TAB


   7/1/18


Aspirin (Low Dose Aspirin) 81 Mg Tablet.dr, 81 MG PO DAILY, #30 TAB


   7/1/18


Omeprazole* (Omeprazole*) 10 Mg Capsule.dr, 10 MG PO DAILY, #30 CAP


   7/1/18





Allergies


Allergies:  


Coded Allergies:  


     No Known Drug Allergies (Verified  Allergy, Unknown, 7/1/18)





PMhx/Soc


History of Surgery:  Yes (Hysterectomy)


Anesthesia Reaction:  No


Hx Neurological Disorder:  No


Hx Respiratory Disorders:  No


Hx Cardiac Disorders:  Yes (HTN)


Hx Psychiatric Problems:  No


Hx Miscellaneous Medical Probl:  Yes (DM,Dyslipidemia)


Hx Alcohol Use:  No


Hx Substance Use:  No


Hx Tobacco Use:  No


Smoking Status:  Never smoker





FmHx


Family History:  No diabetes, No coronary disease, No other





Physical Exam


Vitals





Vital Signs


  Date      Temp  Pulse  Resp  B/P (MAP)   Pulse Ox  O2          O2 Flow    FiO2


Time                                                 Delivery    Rate


    6/2/19  97.8     96    18      173/83        97


     12:29                          (113)





Physical Exam


Const:   No acute distress


Head:   Atraumatic 


Eyes:    Normal Conjunctiva


ENT:    Normal External Ears, Nose and Mouth.  Tonsils are nonedematous.  Tongue


is not edematous.  There is no angioedema.  Airway is patent.  There is no 


stridor.


Neck:               Full range of motion. No meningismus.


Resp:   Clear to auscultation bilaterally


Cardio:   Regular rate and rhythm, no murmurs


Abd:    Soft, non tender, non distended. Normal bowel sounds


Skin:   Urticaria's rash noted over arms and stomach bilaterally.


Back:   No midline or flank tenderness


Ext:    No cyanosis, or edema


Neur:   Awake and alert


Psych:    Normal Mood and Affect


Results 24 hrs





Current Medications


 Medications
   Dose
          Sig/Gala
       Start Time
   Status  Last


 (Trade)       Ordered        Route
 PRN     Stop Time              Admin
Dose


                              Reason                                Admin


                50 mg          ONCE  ONCE
    6/2/19        DC            6/2/19


Diphenhydrami                 IM
            14:00
 6/2/19                13:50



ne
 HCl
                                     14:01


(Benadryl)


 Prednisone
    60 mg          ONCE  ONCE
    6/2/19        DC            6/2/19


(Prednisone)                  PO
            14:00
 6/2/19                13:49



                                             14:01


 Famotidine
    40 mg          ONCE  ONCE
    6/2/19        DC            6/2/19


(Pepcid)                      PO
            14:00
 6/2/19                13:49



                                             14:01








Procedures/MDM


MDM: Patients presentation is consistent with allergic reaction. Patient treated


with prednisone, pepcid, and benadryl. Patient discharged with 4 day course of 


prednisone and benadryl.  At no time during the ER course did patient exhibit 


signs of anaphylaxis, respiratory distress, or angioedema. Patient's vitals were


WNL throughout the ER course at time of discharge.  Patient discharged with 


strict ER precauctions. Patient advised to follow up with PMD. All questions 


answered at discharge.





Departure


Diagnosis:  


   Primary Impression:  


   Allergic reaction


Condition:  Stable


Patient Instructions:  First Aid: Allergic Reactions


Referrals:  


[facility] (PCP)





Additional Instructions:  


FOLLOW UP WITH YOUR PRIMARY CARE PHYSICIAN TOMORROW.Return to this facility if 


you are not improving as expected.











FLAKITA HARDEN                Jun 2, 2019 14:44

## 2019-08-24 ENCOUNTER — HOSPITAL ENCOUNTER (EMERGENCY)
Dept: HOSPITAL 10 - FTE | Age: 59
Discharge: HOME | End: 2019-08-24
Payer: MEDICAID

## 2019-08-24 ENCOUNTER — HOSPITAL ENCOUNTER (EMERGENCY)
Dept: HOSPITAL 91 - FTE | Age: 59
Discharge: HOME | End: 2019-08-24
Payer: MEDICAID

## 2019-08-24 VITALS — RESPIRATION RATE: 16 BRPM | SYSTOLIC BLOOD PRESSURE: 142 MMHG | HEART RATE: 102 BPM | DIASTOLIC BLOOD PRESSURE: 81 MMHG

## 2019-08-24 VITALS
BODY MASS INDEX: 28.17 KG/M2 | WEIGHT: 169.09 LBS | HEIGHT: 65 IN | WEIGHT: 169.09 LBS | BODY MASS INDEX: 28.17 KG/M2 | HEIGHT: 65 IN

## 2019-08-24 DIAGNOSIS — E11.9: ICD-10-CM

## 2019-08-24 DIAGNOSIS — Z79.84: ICD-10-CM

## 2019-08-24 DIAGNOSIS — Z79.82: ICD-10-CM

## 2019-08-24 DIAGNOSIS — L56.8: Primary | ICD-10-CM

## 2019-08-24 DIAGNOSIS — I10: ICD-10-CM
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
## 2019-08-24 PROCEDURE — 99283 EMERGENCY DEPT VISIT LOW MDM: CPT

## 2019-09-26 ENCOUNTER — HOSPITAL ENCOUNTER (EMERGENCY)
Dept: HOSPITAL 10 - FTE | Age: 59
Discharge: HOME | End: 2019-09-26
Payer: MEDICAID

## 2019-09-26 ENCOUNTER — HOSPITAL ENCOUNTER (EMERGENCY)
Dept: HOSPITAL 91 - FTE | Age: 59
Discharge: HOME | End: 2019-09-26
Payer: MEDICAID

## 2019-09-26 VITALS
HEIGHT: 63 IN | WEIGHT: 171.52 LBS | HEIGHT: 63 IN | BODY MASS INDEX: 30.39 KG/M2 | BODY MASS INDEX: 30.39 KG/M2 | WEIGHT: 171.52 LBS

## 2019-09-26 VITALS — SYSTOLIC BLOOD PRESSURE: 151 MMHG | HEART RATE: 89 BPM | DIASTOLIC BLOOD PRESSURE: 74 MMHG | RESPIRATION RATE: 16 BRPM

## 2019-09-26 DIAGNOSIS — Z79.82: ICD-10-CM

## 2019-09-26 DIAGNOSIS — L50.9: Primary | ICD-10-CM

## 2019-09-26 DIAGNOSIS — I10: ICD-10-CM

## 2019-09-26 DIAGNOSIS — E11.9: ICD-10-CM

## 2019-09-26 DIAGNOSIS — Z79.84: ICD-10-CM

## 2019-09-26 PROCEDURE — 96372 THER/PROPH/DIAG INJ SC/IM: CPT

## 2019-09-26 PROCEDURE — 99284 EMERGENCY DEPT VISIT MOD MDM: CPT

## 2019-09-26 RX ADMIN — DEXAMETHASONE SODIUM PHOSPHATE 1 MG: 10 INJECTION, SOLUTION INTRAMUSCULAR; INTRAVENOUS at 19:51

## 2019-09-26 RX ADMIN — FAMOTIDINE 1 MG: 20 TABLET ORAL at 19:51

## 2019-10-25 ENCOUNTER — HOSPITAL ENCOUNTER (EMERGENCY)
Dept: HOSPITAL 10 - FTE | Age: 59
Discharge: HOME | End: 2019-10-25
Payer: MEDICAID

## 2019-10-25 VITALS — DIASTOLIC BLOOD PRESSURE: 59 MMHG | RESPIRATION RATE: 20 BRPM | SYSTOLIC BLOOD PRESSURE: 120 MMHG | HEART RATE: 69 BPM

## 2019-10-25 VITALS
WEIGHT: 167.55 LBS | BODY MASS INDEX: 30.83 KG/M2 | BODY MASS INDEX: 30.83 KG/M2 | WEIGHT: 167.55 LBS | HEIGHT: 62 IN | HEIGHT: 62 IN

## 2019-10-25 DIAGNOSIS — E11.9: ICD-10-CM

## 2019-10-25 DIAGNOSIS — Z79.84: ICD-10-CM

## 2019-10-25 DIAGNOSIS — R30.0: ICD-10-CM

## 2019-10-25 DIAGNOSIS — R35.0: ICD-10-CM

## 2019-10-25 DIAGNOSIS — R30.9: Primary | ICD-10-CM

## 2019-10-25 DIAGNOSIS — I10: ICD-10-CM

## 2019-10-25 DIAGNOSIS — R10.9: ICD-10-CM

## 2019-10-25 DIAGNOSIS — Z79.82: ICD-10-CM

## 2019-10-25 DIAGNOSIS — R39.15: ICD-10-CM

## 2019-10-25 PROCEDURE — 81001 URINALYSIS AUTO W/SCOPE: CPT

## 2019-10-25 PROCEDURE — 96375 TX/PRO/DX INJ NEW DRUG ADDON: CPT

## 2019-10-25 PROCEDURE — 36415 COLL VENOUS BLD VENIPUNCTURE: CPT

## 2019-10-25 PROCEDURE — 81003 URINALYSIS AUTO W/O SCOPE: CPT

## 2019-10-25 PROCEDURE — 85025 COMPLETE CBC W/AUTO DIFF WBC: CPT

## 2019-10-25 PROCEDURE — 80053 COMPREHEN METABOLIC PANEL: CPT

## 2019-10-25 PROCEDURE — 74176 CT ABD & PELVIS W/O CONTRAST: CPT

## 2019-10-25 PROCEDURE — 96374 THER/PROPH/DIAG INJ IV PUSH: CPT
